# Patient Record
Sex: MALE | Race: WHITE | Employment: FULL TIME | URBAN - METROPOLITAN AREA
[De-identification: names, ages, dates, MRNs, and addresses within clinical notes are randomized per-mention and may not be internally consistent; named-entity substitution may affect disease eponyms.]

---

## 2020-06-11 ENCOUNTER — OFFICE VISIT (OUTPATIENT)
Dept: ORTHOPEDIC SURGERY | Age: 60
End: 2020-06-11
Payer: COMMERCIAL

## 2020-06-11 PROCEDURE — L3040 FT ARCH SUPRT PREMOLD LONGIT: HCPCS | Performed by: ORTHOPAEDIC SURGERY

## 2020-06-11 PROCEDURE — 99204 OFFICE O/P NEW MOD 45 MIN: CPT | Performed by: ORTHOPAEDIC SURGERY

## 2020-06-25 ENCOUNTER — OFFICE VISIT (OUTPATIENT)
Dept: ORTHOPEDIC SURGERY | Age: 60
End: 2020-06-25
Payer: COMMERCIAL

## 2020-06-25 VITALS — BODY MASS INDEX: 25.05 KG/M2 | WEIGHT: 175 LBS | TEMPERATURE: 97.8 F | HEIGHT: 70 IN

## 2020-06-25 PROBLEM — S83.231A COMPLEX TEAR OF MEDIAL MENISCUS OF RIGHT KNEE AS CURRENT INJURY: Status: ACTIVE | Noted: 2020-06-25

## 2020-06-25 PROBLEM — M25.561 ACUTE PAIN OF RIGHT KNEE: Status: ACTIVE | Noted: 2020-06-25

## 2020-06-25 PROCEDURE — 99214 OFFICE O/P EST MOD 30 MIN: CPT | Performed by: ORTHOPAEDIC SURGERY

## 2020-06-25 NOTE — PROGRESS NOTES
6/25/20  History of Present Illness:  Marlene Mackey is a 64 y.o. male    Chief complaint today in the office: Check evaluation right knee here today to discuss options    Location right knee   Severity moderate to severe  Duration 6 months  Associated sign/symptoms pain, catching, locking, sharp stabbing pains trouble squatting trouble jogging    Medical History  Patient's medications, allergies, past medical, surgical, social and family histories were reviewed and updated as appropriate. I have reviewed and discussed the below Pain assessment findings with the patient. Review of Systems  No new rashes  No numbness  No tingling  No fever  No depression  No new pain patterns  Pertinent items are noted in HPI  Review of systems reviewed from Patient History Form dated on 6/11/2020 and available in the patient's chart under the Media tab. No change in the patient's medical history form. Examination:  General Exam:    Vitals: Temperature 97.8 °F (36.6 °C), height 5' 10\" (1.778 m), weight 175 lb (79.4 kg). BMI Readings from Last 3 Encounters:   06/25/20 25.11 kg/m²     Constitutional: Patient is adequately groomed with no evidence of malnutrition  Mental Status: The patient is alert and oriented to time, place and person. The patient's mood and affect are appropriate. Lymphatic: The lymphatic examination bilaterally reveals all areas to be without enlargement or induration. Vascular: Examination reveals no swelling or calf tenderness. Peripheral pulses are palpable and 2+. Neurological: The patient has good coordination. There is no weakness or sensory deficit. Skin:    Head/Neck: inspection reveals no rashes, ulcerations or lesions. Trunk:  inspection reveals no rashes, ulcerations or lesions. Right Lower Extremity: inspection reveals no rashes, ulcerations or lesions. Left Lower Extremity: inspection reveals no rashes, ulcerations or lesions. PHYSICAL EXAM:      Knee Examination  Inspection:  No abrasions no lacerations no signs of infection or obvious deformity mild to moderate obvious swelling and joint effusion. Palpation:   Palpation reveals moderate pain medial joint line,   Mild lateral joint line pain, mild joint effusion. Range of Motion: 0-150 degrees flexion/ extension   Hip extension to 20 hip flexion to 70+  Lumbar ROM -20 extension flexion to 6 inches from the floor. Strength: Quadriceps testing 5/5, hamstring muscle testing 5/5, EHL against resistance is 5/5, hip flexor strength is intact 5/5, internal and external rotation of the hip against resistance is also intact 5/5. Special Tests: stable Lachman, negative anterior drawer, negative pivot shift, no posterior sag no posterior drawer does not open to valgus or varus stress at 0 or 30°, Steinmann's positive painful medial, Shilpa's positive painful medial, Homans negative Stephan negative, pedal pulses are +2/4 capillary refill is brisk sensation is intact ankle exam and hip exam are shows no pain with full range of motion provocative testing of the hip is nonpainful muscle testing around the hip is 5 over 5. Lumbar flexion to 6 inches from floor without pain. Gait: antalgic     Reflex: Intact  Lower extremity Deep tendon reflexes +2/4 and equal bilaterally for patella and Achilles. Upper extremity reflexes:  of the biceps, triceps, brachioradialis +2/4 equal bilaterally. Contralateral  Knee: Negative Lachman negative anterior drawer negative pivot shift no posterior sag no posterior drawer does not open to valgus or varus stress at 0 or 30° negative Steinmann's negative Shilpa's negative Homans negative Stephan pedal pulses are +2/4 capillary refill is brisk sensation is intact ankle exam and hip exam are shows no pain with full range of motion provocative testing of the hip is nonpainful muscle testing around the hip is 5 over 5.       Hip and lumbar testing does not reproduce pain evocative testing does not reproduce symptomatology. Additional Examinations:  Right Upper Extremity:  Examination of the right upper extremity does not show any tenderness, deformity or injury. Range of motion is unremarkable. There is no gross instability. There are no rashes, ulcerations or lesions. Strength and tone are normal.  Left Upper Extremity: Examination of the left upper extremity does not show any tenderness, deformity or injury. Range of motion is unremarkable. There is no gross instability. There are no rashes, ulcerations or lesions. Strength and tone are normal.  Lower Back: Examination of the lower back does not show any tenderness, deformity or injury. Range of motion is unremarkable. There is no gross instability. There are no rashes, ulcerations or lesions. Strength and tone are normal.    History reviewed. No pertinent surgical history. .    Radiology:     X-rays reviewed in office:  I independently reviewed the films in the office today. Views MRI multiple views  Body Part right knee  Impression large complex medial meniscus tear    Xr Knee Right (min 4 Views)    Result Date: 2020  Radiology exam is complete. No Radiologist dictation. Please follow up with ordering provider. Mri Lower Extremity W Jt Wo Contrast    Result Date: 2020  Site: [facility] #: [id number] #: [id number] Procedure: MR Right Knee w/o Contrast ; Reason for Exam: acute right knee pain, r/o mmt This document is confidential medical information. Unauthorized disclosure or use of this information is prohibited by law. If you are not the intended recipient of this document, please advise us by calling immediately [phone]. [facility], [address] Patient Name: Terrance Dubon Case ID: [id number] Patient : 1960 Referring Physician: Damaris Rivers DO Exam Date: 2020 Exam Description: MR Right Knee w/o Contrast HISTORY:  Acute right knee pain, would like to proceed. The patient will be scheduled accordingly    5. Healthy Lifestyle Measures:  Patient education material reviewing the following was distributed to Keren Anguiano  Anatomic drawings  Healthy lifestyle education  Advanced imaging preparedness    Proper lifting and carrying techniques,   Weight management discussed  Quitting smoking      6. Follow up:  after surgery      Keren Anguiano was instructed to call the office if his symptoms worsen or if new symptoms appear prior to the next scheduled visit. He is specifically instructed to contact the office between now & his scheduled appointment if he has concerns related to his condition or if he needs assistance in scheduling the above tests. He is   welcome to call for an appointment sooner if he has any additional concerns or questions. Yanira Sheikh DO    [facility]  Sports Fellowship Trained  Board Certified  [employer]se [employer] Team Physician      Disclaimer: \"This note was dictated with voice recognition software. Though review and correction are routine, we apologize for any errors. \"    The patient was counseled at length about the risks of gunner Covid-19 during their perioperative period and any recovery window from their procedure. The patient was made aware that gunner Covid-19  may worsen their prognosis for recovering from their procedure  and lend to a higher morbidity and/or mortality risk. All material risks, benefits, and reasonable alternatives including postponing the procedure were discussed. The patient does wish to proceed with the procedure at this time.

## 2020-06-30 ENCOUNTER — TELEPHONE (OUTPATIENT)
Dept: ORTHOPEDIC SURGERY | Age: 60
End: 2020-06-30

## 2020-07-01 NOTE — PROGRESS NOTES
Name_______________________________________Printed:____________________  Date and time of surgery_________7/7/20 1415_______________Arrival Time:______1245 masc__________   1. The instructions given regarding when and if a patient needs to stop oral intake prior to surgery varies. Follow the specific instructions you were given                  _x__Nothing to eat or to drink after Midnight the night before.                             ____Endoscopy patient follow your DRS instructions-generally you will be doing a part of the prep after Midnight                   ____Carbo loading or ERAS instructions will be given to select patients-if you have been given those instructions -please do the following                           The evening before your surgery after dinner before midnight drink 40 ounces of gatorade. If you are diabetic use sugar free. The morning of surgery drink 40 ounces of water. This needs to be finished 3 hours prior to your surgery start time. 2. Take the following pills with a small sip of water on the morning of surgery___________________________________________________                  Do not take blood pressure medications ending in pril or sartan the christ prior to surgery or the morning of surgery_   3. Aspirin, Ibuprofen, Advil, Naproxen, Vitamin E and other Anti-inflammatory products and supplements should be stopped for 5 -7days before surgery or as directed by your physician. 4. Check with your Doctor regarding stopping Plavix, Coumadin,Eliquis, Lovenox,Effient,Pradaxa,Xarelto, Fragmin or other blood thinners and follow their instructions. 5. Do not smoke, and do not drink any alcoholic beverages 24 hours prior to surgery. This includes NA Beer. Refrain from the usage of any recreational drugs. 6. You may brush your teeth and gargle the morning of surgery. DO NOT SWALLOW WATER   7.  You MUST make arrangements for a responsible adult to stay on site while you are here and take you home after your surgery. You will not be allowed to leave alone or drive yourself home. It is strongly suggested someone stay with you the first 24 hrs. Your surgery will be cancelled if you do not have a ride home. 8. A parent/legal guardian must accompany a child scheduled for surgery and plan to stay at the hospital until the child is discharged. Please do not bring other children with you. 9. Please wear simple, loose fitting clothing to the hospital.  Letty Porch not bring valuables (money, credit cards, checkbooks, etc.) Do not wear any makeup (including no eye makeup) or nail polish on your fingers or toes. 10. DO NOT wear any jewelry or piercings on day of surgery. All body piercing jewelry must be removed. 11. If you have ___dentures, they will be removed before going to the OR; we will provide you a container. If you wear ___contact lenses or ___glasses, they will be removed; please bring a case for them. 12. Please see your family doctor/pediatrician for a history & physical and/or concerning medications. Bring any test results/reports from your physician's office. PCP________x__________Phone___________H&P Appt. Date________             13 If you  have a Living Will and Durable Power of  for Healthcare, please bring in a copy. 15. Notify your Surgeon if you develop any illness between now and surgery  time, cough, cold, fever, sore throat, nausea, vomiting, etc.  Please notify your surgeon if you experience dizziness, shortness of breath or blurred vision between now & the time of your surgery             15. DO NOT shave your operative site 96 hours prior to surgery. For face & neck surgery, men may use an electric razor 48 hours prior to surgery. 16. Shower the night before or morning of surgery using an antibacterial soap or as you have been instructed.              17. To provide excellent care visitors will be limited to one in the room at any given time. 18.  Please bring picture ID and insurance card. 19.  Visit our web site for additional information:  [email or web address]              20.During flu season no children under the age of 15 are permitted in the hospital for the safety of all patients. 21. If you take a long acting insulin in the evening only  take half of your usual  dose the night  before your procedure              22. If you use a c-pap please bring DOS if staying overnight,             23.For your convenience [facility] has a pharmacy on site to fill your prescriptions. 24. If you use oxygen and have a portable tank please bring it  with you the DOS             25. Bring a complete list of all your medications with name and dose include any supplements. 26. Other__________________________________________   *Please call pre admission testing if you any further questions   [address]. [facility]  [phone]   [address] [place] [address]       All above information reviewed with patient in person or by phone. Patient verbalizes understanding. All questions and concerns addressed. Patient/Rep_________pt___________    Patient instructed to get their COVID-19 test done as directed by their doctor (5-7 days prior to procedure)  or patient states will get on __7/2/20________. I The day the COVID test is done is considered day one. Instructed to self quarantine after test until DOS. There is a one visitor policy at [facility] for the pre-op phase only for surgery and endoscopy cases. The visitor is expected to leave the facility after the patient is taken back for the procedure. Pain management is NO VISITOR policyThe patients ride is expected to remain in the car with a cell phone for communication. If the ride is leaving the hospital grounds please make sure they are back in time for pickup. Have the patient inform the staff on arrival what their rides plans are while the patient is in the facility. At the MAIN there is one visitor allowed. Please note that the visitor policy is subject to change.                                                                                                                                     PRE OP INSTRUCTIONS

## 2020-07-02 ENCOUNTER — OFFICE VISIT (OUTPATIENT)
Dept: PRIMARY CARE CLINIC | Age: 60
End: 2020-07-02
Payer: COMMERCIAL

## 2020-07-02 ENCOUNTER — TELEPHONE (OUTPATIENT)
Dept: ORTHOPEDIC SURGERY | Age: 60
End: 2020-07-02

## 2020-07-02 PROCEDURE — 99211 OFF/OP EST MAY X REQ PHY/QHP: CPT | Performed by: NURSE PRACTITIONER

## 2020-07-02 RX ORDER — MELOXICAM 15 MG/1
15 TABLET ORAL DAILY
Qty: 90 TABLET | Refills: 3 | Status: SHIPPED | OUTPATIENT
Start: 2020-07-07

## 2020-07-02 RX ORDER — HYDROCODONE BITARTRATE AND ACETAMINOPHEN 5; 325 MG/1; MG/1
1 TABLET ORAL EVERY 6 HOURS PRN
Qty: 28 TABLET | Refills: 0 | Status: SHIPPED | OUTPATIENT
Start: 2020-07-07 | End: 2020-07-14

## 2020-07-02 NOTE — TELEPHONE ENCOUNTER
Maria Del Carmen Roll    Date: 07/07/2020  Type of SX:  OP  Location: [facility]   CPT: 80935   DX Code: L61.831L  SX area: RT knee scope  Insurance: [employer]

## 2020-07-02 NOTE — PATIENT INSTRUCTIONS
Steps to help prevent the spread of COVID-19 if you are sick  SOURCE - [email or web address] html     Stay home except to get medical care   ; Stay home: People who are mildly ill with COVID-19 are able to isolate at home during their illness. You should restrict activities outside your home, except for getting medical care.   ; Avoid public areas: Do not go to work, school, or public areas.   ; Avoid public transportation: Avoid using public transportation, ride-sharing, or taxis.  ; Separate yourself from other people and animals in your home   ; Stay away from others: As much as possible, you should stay in a specific room and away from other people in your home. Also, you should use a separate bathroom, if available.   ; Limit contact with pets & animals: You should restrict contact with pets and other animals while you are sick with COVID-19, just like you would around other people. Although there have not been reports of pets or other animals becoming sick with COVID-19, it is still recommended that people sick with COVID-19 limit contact with animals until more information is known about the virus. ; When possible, have another member of your household care for your animals while you are sick. If you are sick with COVID-19, avoid contact with your pet, including petting, snuggling, being kissed or licked, and sharing food. If you must care for your pet or be around animals while you are sick, wash your hands before and after you interact with pets and wear a facemask. See COVID-19 and Animals for more information. Other considerations   The ill person should eat/be fed in their room if possible. Non-disposable  items used should be handled with gloves and washed with hot water or in a . Clean hands after handling used  items.  If possible, dedicate a lined trash can for the ill person.  Use gloves when removing garbage bags, handling, and disposing of trash. Wash hands after handling or disposing of trash.  Consider consulting with your local health department about trash disposal guidance if available. Information for Household Members and Caregivers of Someone who is Sick   Call ahead before visiting your doctor   Call ahead: If you have a medical appointment, call the healthcare provider and tell them that you have or may have COVID-19. This will help the healthcare provider's office take steps to keep other people from getting infected or exposed. Wear a facemask if you are sick   ; If you are sick: You should wear a facemask when you are around other people (e.g., sharing a room or vehicle) or pets and before you enter a healthcare provider's office. ; If you are caring for others: If the person who is sick is not able to wear a facemask (for example, because it causes trouble breathing), then people who live with the person who is sick should not stay in the same room with them, or they should wear a facemask if they enter a room with the person who is sick. Cover your coughs and sneezes   ; Cover: Cover your mouth and nose with a tissue when you cough or sneeze.   ; Dispose: Throw used tissues in a lined trash can.   ; Wash hands: Immediately wash your hands with soap and water for at least 20 seconds or, if soap and water are not available, clean your hands with an alcohol-based hand  that contains at least 60% alcohol. Clean your hands often   ;  Wash hands: Wash your hands often with soap and water for at least 20 seconds, especially after blowing your nose, coughing, or sneezing; going to the bathroom; and before eating or preparing food.   ; Hand : If soap and water are not readily available, use an alcohol-based hand  with at least 60% alcohol, covering all surfaces of your hands and rubbing them together until they feel dry.   ; Soap and water: Soap and water are the best option if hands are visibly dirty.   ; Avoid touching: Avoid touching your eyes, nose, and mouth with unwashed hands. Handwashing Tips   ; Wet your hands with clean, running water (warm or cold), turn off the tap, and apply soap.  ; Lather your hands by rubbing them together with the soap. Lather the backs of your hands, between your fingers, and under your nails. ; Scrub your hands for at least 20 seconds. Need a timer? Hum the Zaleski from beginning to end twice.  ; Rinse your hands well under clean, running water.  ; Dry your hands using a clean towel or air dry them. Avoid sharing personal household items   ; Do not share: You should not share dishes, drinking glasses, cups, eating utensils, towels, or bedding with other people or pets in your home.   ; Wash thoroughly after use: After using these items, they should be washed thoroughly with soap and water. Clean all high-touch surfaces everyday   ; Clean and disinfect: Practice routine cleaning of high touch surfaces.  ; High touch surfaces include counters, tabletops, doorknobs, bathroom fixtures, toilets, phones, keyboards, tablets, and bedside tables.  ; Disinfect areas with bodily fluids: Also, clean any surfaces that may have blood, stool, or body fluids on them.   ; Household : Use a household cleaning spray or wipe, according to the label instructions. Labels contain instructions for safe and effective use of the cleaning product including precautions you should take when applying the product, such as wearing gloves and making sure you have good ventilation during use of the product.     Monitor your symptoms   Seek medical attention: Seek prompt medical attention if your illness is worsening     (e.g., difficulty breathing).   ; Call your doctor: Before seeking care, call your healthcare provider and tell them that you have, or are being evaluated for, COVID-19.   ; Wear a facemask when sick: Put on a facemask before you enter the facility. These steps will help the healthcare provider's office to keep other people in the office or waiting room from getting infected or exposed. ; Alert health department: Ask your healthcare provider to call the local or state health department. Persons who are placed under active monitoring or facilitated self-monitoring should follow instructions provided by their local health department or occupational health professionals, as appropriate.  ; Call 911 if you have a medical emergency: If you have a medical emergency and need to call 911, notify the dispatch personnel that you have, or are being evaluated for COVID-19. If possible, put on a facemask before emergency medical services arrive. Thank you for enrolling in [address] [facility]. Please follow the instructions below to securely access your online medical record. [employer] allows you to send messages to your doctor, view your test results, renew your prescriptions, schedule appointments, and more. How Do I Sign Up? 1. In your Internet browser, go to [email or web address]  2. Click on the Sign Up Now link in the Sign In box. You will see the New Member Sign Up page. 3. Enter your [employer]ora Access Code exactly as it appears below. You will not need to use this code after youve completed the sign-up process. If you do not sign up before the expiration date, you must request a new code. [employer]ora Access Code: [email or web address]  Expires: 8/16/2020  8:54 AM    4. Enter your Social Security Number ([id number]) and Date of Birth (mm/dd/yyyy) as indicated and click Submit. You will be taken to the next sign-up page. 5. Create a [employer] ID. This will be your [employer]ora login ID and cannot be changed, so think of one that is secure and easy to remember. 6. Create a [employer] password. You can change your password at any time. 7. Enter your Password Reset Question and Answer. This can be used at a later time if you forget your password.    8. Enter your e-mail address. You will receive e-mail notification when new information is available in [address] 9. Click Sign Up. You can now view your medical record. Additional Information  If you have questions, please contact your physician practice where you receive care. Remember, MyChart is NOT to be used for urgent needs. For medical emergencies, dial 911.

## 2020-07-04 LAB
SARS-COV-2: NOT DETECTED
SOURCE: NORMAL

## 2020-07-06 ENCOUNTER — HOSPITAL ENCOUNTER (OUTPATIENT)
Age: 60
Setting detail: OUTPATIENT SURGERY
Discharge: HOME | End: 2020-07-07
Attending: ORTHOPAEDIC SURGERY | Admitting: ORTHOPAEDIC SURGERY
Payer: COMMERCIAL

## 2020-07-07 ENCOUNTER — ANESTHESIA EVENT (OUTPATIENT)
Dept: OPERATING ROOM | Age: 60
End: 2020-07-07
Payer: COMMERCIAL

## 2020-07-07 ENCOUNTER — ANESTHESIA (OUTPATIENT)
Dept: OPERATING ROOM | Age: 60
End: 2020-07-07
Payer: COMMERCIAL

## 2020-07-07 VITALS
TEMPERATURE: 97.5 F | RESPIRATION RATE: 16 BRPM | DIASTOLIC BLOOD PRESSURE: 72 MMHG | HEART RATE: 54 BPM | OXYGEN SATURATION: 99 % | BODY MASS INDEX: 24.62 KG/M2 | HEIGHT: 70 IN | WEIGHT: 172 LBS | SYSTOLIC BLOOD PRESSURE: 116 MMHG

## 2020-07-07 VITALS
RESPIRATION RATE: 13 BRPM | SYSTOLIC BLOOD PRESSURE: 115 MMHG | DIASTOLIC BLOOD PRESSURE: 65 MMHG | OXYGEN SATURATION: 98 %

## 2020-07-07 PROCEDURE — 2709999900 HC NON-CHARGEABLE SUPPLY: Performed by: ORTHOPAEDIC SURGERY

## 2020-07-07 PROCEDURE — 3700000000 HC ANESTHESIA ATTENDED CARE: Performed by: ORTHOPAEDIC SURGERY

## 2020-07-07 PROCEDURE — 7100000010 HC PHASE II RECOVERY - FIRST 15 MIN: Performed by: ORTHOPAEDIC SURGERY

## 2020-07-07 PROCEDURE — 2580000003 HC RX 258: Performed by: ORTHOPAEDIC SURGERY

## 2020-07-07 PROCEDURE — 6360000002 HC RX W HCPCS: Performed by: ORTHOPAEDIC SURGERY

## 2020-07-07 PROCEDURE — 7100000000 HC PACU RECOVERY - FIRST 15 MIN: Performed by: ORTHOPAEDIC SURGERY

## 2020-07-07 PROCEDURE — 2500000003 HC RX 250 WO HCPCS: Performed by: NURSE ANESTHETIST, CERTIFIED REGISTERED

## 2020-07-07 PROCEDURE — 2720000010 HC SURG SUPPLY STERILE: Performed by: ORTHOPAEDIC SURGERY

## 2020-07-07 PROCEDURE — 2580000003 HC RX 258: Performed by: ANESTHESIOLOGY

## 2020-07-07 PROCEDURE — 7100000011 HC PHASE II RECOVERY - ADDTL 15 MIN: Performed by: ORTHOPAEDIC SURGERY

## 2020-07-07 PROCEDURE — 6360000002 HC RX W HCPCS: Performed by: NURSE ANESTHETIST, CERTIFIED REGISTERED

## 2020-07-07 PROCEDURE — 3700000001 HC ADD 15 MINUTES (ANESTHESIA): Performed by: ORTHOPAEDIC SURGERY

## 2020-07-07 PROCEDURE — 3600000004 HC SURGERY LEVEL 4 BASE: Performed by: ORTHOPAEDIC SURGERY

## 2020-07-07 PROCEDURE — 7100000001 HC PACU RECOVERY - ADDTL 15 MIN: Performed by: ORTHOPAEDIC SURGERY

## 2020-07-07 PROCEDURE — 2500000003 HC RX 250 WO HCPCS: Performed by: ORTHOPAEDIC SURGERY

## 2020-07-07 PROCEDURE — 3600000014 HC SURGERY LEVEL 4 ADDTL 15MIN: Performed by: ORTHOPAEDIC SURGERY

## 2020-07-07 RX ORDER — FENTANYL CITRATE 50 UG/ML
INJECTION, SOLUTION INTRAMUSCULAR; INTRAVENOUS PRN
Status: DISCONTINUED | OUTPATIENT
Start: 2020-07-07 | End: 2020-07-07 | Stop reason: SDUPTHER

## 2020-07-07 RX ORDER — PROPOFOL 10 MG/ML
INJECTION, EMULSION INTRAVENOUS CONTINUOUS PRN
Status: DISCONTINUED | OUTPATIENT
Start: 2020-07-07 | End: 2020-07-07 | Stop reason: SDUPTHER

## 2020-07-07 RX ORDER — LIDOCAINE HYDROCHLORIDE 10 MG/ML
1 INJECTION, SOLUTION EPIDURAL; INFILTRATION; INTRACAUDAL; PERINEURAL
Status: DISCONTINUED | OUTPATIENT
Start: 2020-07-07 | End: 2020-07-07 | Stop reason: HOSPADM

## 2020-07-07 RX ORDER — SODIUM CHLORIDE 0.9 % (FLUSH) 0.9 %
10 SYRINGE (ML) INJECTION EVERY 12 HOURS SCHEDULED
Status: DISCONTINUED | OUTPATIENT
Start: 2020-07-07 | End: 2020-07-07 | Stop reason: HOSPADM

## 2020-07-07 RX ORDER — PROPOFOL 10 MG/ML
INJECTION, EMULSION INTRAVENOUS PRN
Status: DISCONTINUED | OUTPATIENT
Start: 2020-07-07 | End: 2020-07-07 | Stop reason: SDUPTHER

## 2020-07-07 RX ORDER — HYDRALAZINE HYDROCHLORIDE 20 MG/ML
5 INJECTION INTRAMUSCULAR; INTRAVENOUS EVERY 10 MIN PRN
Status: DISCONTINUED | OUTPATIENT
Start: 2020-07-07 | End: 2020-07-07 | Stop reason: HOSPADM

## 2020-07-07 RX ORDER — SODIUM CHLORIDE 0.9 % (FLUSH) 0.9 %
10 SYRINGE (ML) INJECTION PRN
Status: DISCONTINUED | OUTPATIENT
Start: 2020-07-07 | End: 2020-07-07 | Stop reason: HOSPADM

## 2020-07-07 RX ORDER — SODIUM CHLORIDE, SODIUM LACTATE, POTASSIUM CHLORIDE, CALCIUM CHLORIDE 600; 310; 30; 20 MG/100ML; MG/100ML; MG/100ML; MG/100ML
INJECTION, SOLUTION INTRAVENOUS CONTINUOUS
Status: DISCONTINUED | OUTPATIENT
Start: 2020-07-07 | End: 2020-07-07 | Stop reason: HOSPADM

## 2020-07-07 RX ORDER — LIDOCAINE HYDROCHLORIDE 20 MG/ML
INJECTION, SOLUTION EPIDURAL; INFILTRATION; INTRACAUDAL; PERINEURAL PRN
Status: DISCONTINUED | OUTPATIENT
Start: 2020-07-07 | End: 2020-07-07 | Stop reason: SDUPTHER

## 2020-07-07 RX ORDER — MEPERIDINE HYDROCHLORIDE 25 MG/ML
12.5 INJECTION INTRAMUSCULAR; INTRAVENOUS; SUBCUTANEOUS EVERY 5 MIN PRN
Status: DISCONTINUED | OUTPATIENT
Start: 2020-07-07 | End: 2020-07-07 | Stop reason: HOSPADM

## 2020-07-07 RX ORDER — LABETALOL HYDROCHLORIDE 5 MG/ML
5 INJECTION, SOLUTION INTRAVENOUS EVERY 10 MIN PRN
Status: DISCONTINUED | OUTPATIENT
Start: 2020-07-07 | End: 2020-07-07 | Stop reason: HOSPADM

## 2020-07-07 RX ORDER — HYDROMORPHONE HCL 110MG/55ML
0.5 PATIENT CONTROLLED ANALGESIA SYRINGE INTRAVENOUS EVERY 5 MIN PRN
Status: DISCONTINUED | OUTPATIENT
Start: 2020-07-07 | End: 2020-07-07 | Stop reason: HOSPADM

## 2020-07-07 RX ORDER — ONDANSETRON 2 MG/ML
INJECTION INTRAMUSCULAR; INTRAVENOUS PRN
Status: DISCONTINUED | OUTPATIENT
Start: 2020-07-07 | End: 2020-07-07 | Stop reason: SDUPTHER

## 2020-07-07 RX ORDER — OXYCODONE HYDROCHLORIDE AND ACETAMINOPHEN 5; 325 MG/1; MG/1
1 TABLET ORAL
Status: DISCONTINUED | OUTPATIENT
Start: 2020-07-07 | End: 2020-07-07 | Stop reason: HOSPADM

## 2020-07-07 RX ORDER — ONDANSETRON 2 MG/ML
4 INJECTION INTRAMUSCULAR; INTRAVENOUS
Status: DISCONTINUED | OUTPATIENT
Start: 2020-07-07 | End: 2020-07-07 | Stop reason: HOSPADM

## 2020-07-07 RX ORDER — DEXAMETHASONE SODIUM PHOSPHATE 4 MG/ML
INJECTION, SOLUTION INTRA-ARTICULAR; INTRALESIONAL; INTRAMUSCULAR; INTRAVENOUS; SOFT TISSUE PRN
Status: DISCONTINUED | OUTPATIENT
Start: 2020-07-07 | End: 2020-07-07 | Stop reason: SDUPTHER

## 2020-07-07 RX ORDER — KETOROLAC TROMETHAMINE 30 MG/ML
INJECTION, SOLUTION INTRAMUSCULAR; INTRAVENOUS PRN
Status: DISCONTINUED | OUTPATIENT
Start: 2020-07-07 | End: 2020-07-07 | Stop reason: SDUPTHER

## 2020-07-07 RX ADMIN — FENTANYL CITRATE 25 MCG: 50 INJECTION, SOLUTION INTRAMUSCULAR; INTRAVENOUS at 13:04

## 2020-07-07 RX ADMIN — SODIUM CHLORIDE, POTASSIUM CHLORIDE, SODIUM LACTATE AND CALCIUM CHLORIDE: 600; 310; 30; 20 INJECTION, SOLUTION INTRAVENOUS at 11:49

## 2020-07-07 RX ADMIN — PROPOFOL 120 MCG/KG/MIN: 10 INJECTION, EMULSION INTRAVENOUS at 13:06

## 2020-07-07 RX ADMIN — KETOROLAC TROMETHAMINE 15 MG: 30 INJECTION, SOLUTION INTRAMUSCULAR; INTRAVENOUS at 13:45

## 2020-07-07 RX ADMIN — LIDOCAINE HYDROCHLORIDE 60 MG: 20 INJECTION, SOLUTION EPIDURAL; INFILTRATION; INTRACAUDAL; PERINEURAL at 13:06

## 2020-07-07 RX ADMIN — FENTANYL CITRATE 25 MCG: 50 INJECTION, SOLUTION INTRAMUSCULAR; INTRAVENOUS at 13:45

## 2020-07-07 RX ADMIN — PROPOFOL 80 MG: 10 INJECTION, EMULSION INTRAVENOUS at 13:06

## 2020-07-07 RX ADMIN — DEXAMETHASONE SODIUM PHOSPHATE 8 MG: 4 INJECTION, SOLUTION INTRAMUSCULAR; INTRAVENOUS at 13:10

## 2020-07-07 RX ADMIN — FENTANYL CITRATE 25 MCG: 50 INJECTION, SOLUTION INTRAMUSCULAR; INTRAVENOUS at 13:10

## 2020-07-07 RX ADMIN — ONDANSETRON 4 MG: 2 INJECTION INTRAMUSCULAR; INTRAVENOUS at 13:10

## 2020-07-07 RX ADMIN — FENTANYL CITRATE 25 MCG: 50 INJECTION, SOLUTION INTRAMUSCULAR; INTRAVENOUS at 13:37

## 2020-07-07 RX ADMIN — CEFAZOLIN SODIUM 2 G: 10 INJECTION, POWDER, FOR SOLUTION INTRAVENOUS at 12:39

## 2020-07-07 ASSESSMENT — PULMONARY FUNCTION TESTS
PIF_VALUE: 1
PIF_VALUE: 0
PIF_VALUE: 1
PIF_VALUE: 0
PIF_VALUE: 1
PIF_VALUE: 3
PIF_VALUE: 1

## 2020-07-07 ASSESSMENT — PAIN DESCRIPTION - LOCATION: LOCATION: KNEE

## 2020-07-07 ASSESSMENT — PAIN DESCRIPTION - DESCRIPTORS: DESCRIPTORS: ACHING

## 2020-07-07 ASSESSMENT — PAIN - FUNCTIONAL ASSESSMENT
PAIN_FUNCTIONAL_ASSESSMENT: 0-10
PAIN_FUNCTIONAL_ASSESSMENT: PREVENTS OR INTERFERES SOME ACTIVE ACTIVITIES AND ADLS

## 2020-07-07 ASSESSMENT — PAIN SCALES - GENERAL: PAINLEVEL_OUTOF10: 2

## 2020-07-07 ASSESSMENT — LIFESTYLE VARIABLES: SMOKING_STATUS: 0

## 2020-07-07 ASSESSMENT — PAIN DESCRIPTION - ORIENTATION: ORIENTATION: RIGHT

## 2020-07-07 NOTE — ANESTHESIA POSTPROCEDURE EVALUATION
Department of Anesthesiology  Postprocedure Note    Patient: Johnna Sheth  MRN: [id number]  YOB: 1960  Date of evaluation: 7/7/2020  Time:  2:08 PM     Procedure Summary     Date:  07/07/20 Room / Location:  [address]    Anesthesia Start:  9309 Anesthesia Stop:  6635    Procedure:  RIGHT KNEE ARTHROSCOPY, PARTIAL MEDIAL MENISCECTOMY, SYNOVECTOMY (Right Knee) Diagnosis:  (RIGHT KNEE MEDIAL MENISCUS TEAR S83.241D)    Surgeon:  Abner Clifford DO Responsible Provider:  Selena Ding MD    Anesthesia Type:  MAC ASA Status:  1          Anesthesia Type: MAC    Talib Phase I: Talib Score: 10    Talib Phase II:      Last vitals: Reviewed and per EMR flowsheets.        Anesthesia Post Evaluation    Patient location during evaluation: PACU  Patient participation: complete - patient participated  Level of consciousness: awake and alert  Pain score: 2  Airway patency: patent  Nausea & Vomiting: no vomiting  Complications: no  Cardiovascular status: blood pressure returned to baseline  Respiratory status: acceptable  Hydration status: euvolemic

## 2020-07-07 NOTE — ANESTHESIA PRE PROCEDURE
Date    APPENDECTOMY      DENTAL SURGERY      HAND SURGERY      HERNIA REPAIR         Social History:    Social History     Tobacco Use    Smoking status: Never Smoker    Smokeless tobacco: Never Used   Substance Use Topics    Alcohol use: Yes     Comment: social                                Counseling given: Not Answered      Vital Signs (Current):   Vitals:    07/01/20 1411   Weight: 174 lb (78.9 kg)   Height: 5' 10\" (1.778 m)                                              BP Readings from Last 3 Encounters:   No data found for BP       NPO Status:                                                                                 BMI:   Wt Readings from Last 3 Encounters:   07/01/20 174 lb (78.9 kg)   06/25/20 175 lb (79.4 kg)     Body mass index is 24.97 kg/m². CBC: No results found for: WBC, RBC, HGB, HCT, MCV, RDW, PLT    CMP: No results found for: NA, K, CL, CO2, BUN, CREATININE, GFRAA, AGRATIO, LABGLOM, GLUCOSE, PROT, CALCIUM, BILITOT, ALKPHOS, AST, ALT    POC Tests: No results for input(s): POCGLU, POCNA, POCK, POCCL, POCBUN, POCHEMO, POCHCT in the last 72 hours.     Coags: No results found for: PROTIME, INR, APTT    HCG (If Applicable): No results found for: PREGTESTUR, PREGSERUM, HCG, HCGQUANT     ABGs: No results found for: PHART, PO2ART, YRQ9JSQ, BRT5OAI, BEART, E1BBPTNP     Type & Screen (If Applicable):  No results found for: LABABO, LABRH    Drug/Infectious Status (If Applicable):  No results found for: HIV, HEPCAB    COVID-19 Screening (If Applicable):   Lab Results   Component Value Date    COVID19 Not Detected 07/02/2020         Anesthesia Evaluation  Patient summary reviewed and Nursing notes reviewed no history of anesthetic complications:   Airway: Mallampati: II  TM distance: >3 FB   Neck ROM: full  Mouth opening: > = 3 FB Dental: normal exam         Pulmonary:Negative Pulmonary ROS and normal exam  breath sounds clear to auscultation      (-) COPD, asthma, sleep apnea and not a current

## 2020-07-07 NOTE — OP NOTE
Operative Note      Patient: Josh Rivero  YOB: 1960  MRN: [id number]    Date of Procedure: 7/7/2020    Pre-Op Diagnosis: RIGHT KNEE MEDIAL MENISCUS TEAR S83.241D    Post-Op Diagnosis: Same       Procedure(s):  RIGHT KNEE ARTHROSCOPE, PARTIAL MEDIAL MENISCECTOMY VERSUS REPAIR, AND INDICATED PROCEDURES    Surgeon(s):  Latia Hart DO    Assistant:   * No surgical staff found *    Anesthesia: Monitor Anesthesia Care    Estimated Blood Loss (mL): Minimal    Complications: None    Specimens:   * No specimens in log *    Implants:  * No implants in log *      Drains: * No LDAs found *    Findings: Right knee medial meniscus tear, right knee hyperemic hypertrophic synovitis, right knee chondromalacia    Detailed Description of Procedure:   Right knee diagnostic arthroscopy with right knee arthroscopic partial medial meniscectomy, right knee arthroscopic hyperemic hypertrophic synovectomy, right knee chondroplasty    Electronically signed by Latia Hart DO on 7/7/2020 at 7:00 AM                             [address], [address] [place] [address]       Operative note by  Moises Kaur. Angel Garg MS,   Orthopedic surgeon  Orthopedics sports Fellowship trained  Board-certified  Team Physician for [employer]                       Knee Arthroscopy      Patient Name:  Josh Rivero    YOB: 1960    Medical  Record number: [id number]    Account number: [de-identified]     Date Of Surgery: 7/7/2020    Date Of Dictation: 7/7/2020    Location: [address]    Surgeon: Dr. Stephanie Holland    Assistant: None    Anesthesia: Local with General    Indications:  Chronic pain not alleviated by conservative therapy, positive MRI, not improved with conservative care    Complications: None    Estimated blood loss: Minimal    Preoperative antibiotics:

## 2020-07-10 RX ORDER — CEPHALEXIN 500 MG/1
500 CAPSULE ORAL 3 TIMES DAILY
Qty: 30 CAPSULE | Refills: 0 | OUTPATIENT
Start: 2020-07-10 | End: 2020-07-20

## 2020-07-10 NOTE — PROGRESS NOTES
Patient called stating that his knee is still red and swollen. Patient texted a picture of his knee which I forwarded to Dr. Allyson Ricketts. Dr. Allyson Ricketts said to put the patient on Keflex 500mg QID. Called into [facility] in [place]. Patient has been notified and is to f/u on Monday for his post op.

## 2020-07-13 ENCOUNTER — OFFICE VISIT (OUTPATIENT)
Dept: ORTHOPEDIC SURGERY | Age: 60
End: 2020-07-13

## 2020-07-13 VITALS — HEIGHT: 70 IN | WEIGHT: 172 LBS | TEMPERATURE: 97.4 F | BODY MASS INDEX: 24.62 KG/M2

## 2020-07-13 PROCEDURE — 99024 POSTOP FOLLOW-UP VISIT: CPT | Performed by: ORTHOPAEDIC SURGERY

## 2020-07-13 NOTE — PROGRESS NOTES
7/13/20  HISTORY OF PRESENT ILLNESS:   S/P Knee Arthroscopy  The Patient returns today for their postoperative visit after 7/7/2020 right knee arthroscopy. Pain control has been satisfactory with oral medications. There have been no fevers or chills. PHYSICAL EXAMINATION: Right knee  Inspection reveals expected swelling. Portal sites are clean and dry. The skin is warm. Range of motion is minimally limited by pain and swelling. There is no calf pain  Homans sign is negative. Examination of the contralateral knee reveals warm skin, range of motion within normal limits, good quadriceps bulk, tone and strength, no tenderness to palpation, stable cruciate and collateral ligaments, and no joint line tenderness. Examination of the Patients Lumbar spine reveals the skin is warm and dry. There is no swelling, warmth, or erythema. Range of motion is within normal limits. There is no paraspinal or spinous process tenderness. Ipsilateral and contralateral straight leg raising tests are negative. The distal neurovascular exam is grossly intact and symmetric. ASSESSMENT/PLAN:   The patient is doing well after knee arthroscopy. I have recommended ice, judicious use of NSAIDs, and physical therapy to diminish swelling and restore both motion and strength. I cautioned against overusing the knee, and they will schedule a reevaluation in 4 weeks  They verbalized good understanding of the plan. Sanjeev Guy D.O. [address] [place] [address] 20 and Sports Medicine  Sports Fellowship Trained  Board Certified  [facility] Team Physician      Disclaimer: \"This note was dictated with voice recognition software. Though review and correction are routine, we apologize for any errors. \"

## 2020-07-15 ENCOUNTER — HOSPITAL ENCOUNTER (OUTPATIENT)
Dept: PHYSICAL THERAPY | Age: 60
Setting detail: THERAPIES SERIES
Discharge: HOME | End: 2020-07-15
Payer: COMMERCIAL

## 2020-07-15 PROCEDURE — 97110 THERAPEUTIC EXERCISES: CPT

## 2020-07-15 PROCEDURE — 97016 VASOPNEUMATIC DEVICE THERAPY: CPT

## 2020-07-15 PROCEDURE — 97161 PT EVAL LOW COMPLEX 20 MIN: CPT

## 2020-07-15 PROCEDURE — MISC902 COLD PAC OVERSIZED: Performed by: ORTHOPAEDIC SURGERY

## 2020-07-15 NOTE — FLOWSHEET NOTE
[facility]    Physical Therapy Treatment Note/ Progress Report:     Date:  7/15/2020    Patient Name:  Lashay Lopez  \"Hayder\"  :  1960  MRN: [id number]  Medical/Treatment Diagnosis Information:  · Diagnosis: Complex tear of medial meniscus of right knee (S83.231D), acute pain of right knee (M25.561); s/p R knee arthroscop, PMM, synovectomy, chondroplasty of patella, trochlea, medial femoral condyle 20  · Treatment Diagnosis: Acute right knee pain  Insurance/Certification information:  PT Insurance Information: [employer] - $2287 OOP max, $70 co-pay (goes towards OOP), 40 PT visits per calendar year  Physician Information:  Referring Practitioner: Dr. Brianna Ruiz of care signed (Y/N):     Date of Patient follow up with Physician: 8/10/20     Progress Report: []  Yes  [x]  No     Functional Scale: LEFS = 37%   Date: 7/15/20    Date Range for reporting period:  Beginnin/15/20  Ending:      Progress report due (10 Rx/or 30 days whichever is less): 04     Recertification due (POC duration/ or 90 days whichever is less): 20     Visit # Insurance Allowable Auth Needed   1 36 - pending auth [x]Yes    []No     Pain level:  1-5/10     SUBJECTIVE:  See eval    OBJECTIVE: See eval   Observation:    Test measurements:      RESTRICTIONS/PRECAUTIONS: S/p R knee arthroscopy, PMM, synovectomy, chondroplasty of patella, trochlea and medial femoral condyle 20    Exercises/Interventions:     Therapeutic Ex 15' Resistance Sets/sec Reps Notes          HS stretch EOB  30\" 3    Supine ITB stretch  30\" 3 W/gren strap   Heel slides  10\" 10    Quad sets  5\" 20    SLR abduction  2 10                                                                          Manual Intervention 10'       Knee mobs/PROM 6'   Knee flexion mobs   Tib/Fem Mobs       Patella Mobs       Ankle mobs       STM/IASTM 4'   Distal ITB          NMR re-education Therapeutic Exercise and NMR EXR  [x] (72036) Provided verbal/tactile cueing for activities related to strengthening, flexibility, endurance, ROM for improvements in LE, proximal hip, and core control with self care, mobility, lifting, ambulation.  [] (66189) Provided verbal/tactile cueing for activities related to improving balance, coordination, kinesthetic sense, posture, motor skill, proprioception  to assist with LE, proximal hip, and core control in self care, mobility, lifting, ambulation and eccentric single leg control.      NMR and Therapeutic Activities:    [] (74902 or 55573) Provided verbal/tactile cueing for activities related to improving balance, coordination, kinesthetic sense, posture, motor skill, proprioception and motor activation to allow for proper function of core, proximal hip and LE with self care and ADLs  [] (61582) Gait Re-education- Provided training and instruction to the patient for proper LE, core and proximal hip recruitment and positioning and eccentric body weight control with ambulation re-education including up and down stairs     Home Exercise Program:    [x] (94603) Reviewed/Progressed HEP activities related to strengthening, flexibility, endurance, ROM of core, proximal hip and LE for functional self-care, mobility, lifting and ambulation/stair navigation   [] (52450)Reviewed/Progressed HEP activities related to improving balance, coordination, kinesthetic sense, posture, motor skill, proprioception of core, proximal hip and LE for self care, mobility, lifting, and ambulation/stair navigation      Manual Treatments:  PROM / STM / Oscillations-Mobs:  G-I, II, III, IV (PA's, Inf., Post.)  [x] (53860) Provided manual therapy to mobilize LE, proximal hip and/or LS spine soft tissue/joints for the purpose of modulating pain, promoting relaxation,  increasing ROM, reducing/eliminating soft tissue swelling/inflammation/restriction, improving soft tissue extensibility and allowing for proper ROM for normal function with self care, mobility, lifting and ambulation. Modalities: 15' game-ready to decrease post-op swelling and inflammation     Charges:  Timed Code Treatment Minutes: 25   Total Treatment Minutes: 55       [x] EVAL (LOW) 05486 (typically 20 minutes face-to-face)  [] EVAL (MOD) 65219 (typically 30 minutes face-to-face)  [] EVAL (HIGH) 49946 (typically 45 minutes face-to-face)  [] RE-EVAL     [x] TB(75123) x   1  [] IONTO (69607)  [] NMR (55855) x     [x] VASO (82677)  [] Manual (45922) x     [] Other:  [] TA (06032)x     [] Mech Traction (19920)  [] ES(attended) (93099)     [] ES (un) (78986):     GOALS:   Patient stated goal: \"To be able to run 2 miles\"  []? Progressing: []? Met: []? Not Met: []? Adjusted     Therapist goals for Patient:   Short Term Goals: To be achieved in: 2 weeks  1. Independent in HEP and progression per patient tolerance, in order to prevent re-injury. []? Progressing: []? Met: []? Not Met: []? Adjusted  2. Patient will have a decrease in pain to facilitate improvement in movement, function, and ADLs as indicated by Functional Deficits. []? Progressing: []? Met: []? Not Met: []? Adjusted     Long Term Goals: To be achieved in: 6 weeks  1. Disability index score of 25% or less for the LEFS to assist with reaching prior level of function. []? Progressing: []? Met: []? Not Met: []? Adjusted  2. Patient will demonstrate increased AROM to WNL for R knee to allow for proper joint functioning as indicated by patients Functional Deficits. []? Progressing: []? Met: []? Not Met: []? Adjusted  3. Patient will demonstrate an increase in Strength to good proximal hip strength and control, within 5lb HHD in LE to allow for proper functional mobility as indicated by patients Functional Deficits. []? Progressing: []? Met: []? Not Met: []? Adjusted  4.  Patient will return to walk for 1 hour without increased symptoms or restriction. []? Progressing: []? Met: []? Not Met: []? Adjusted  5. Patient will be able to squat to pick something up off of the floor without increased symptoms or restriction. []? Progressing: []? Met: []? Not Met: []? Adjusted         Progression Towards Functional goals:  [] Patient is progressing as expected towards functional goals listed. [] Progression is slowed due to complexities listed. [] Progression has been slowed due to co-morbidities. [x] Plan just implemented, too soon to assess goals progression  [] Other:     ASSESSMENT:  See eval    Return to Play: (if applicable)   []  Stage 1: Intro to Strength   []  Stage 2: Return to Run and Strength   []  Stage 3: Return to Jump and Strength   []  Stage 4: Dynamic Strength and Agility   []  Stage 5: Sport Specific Training     []  Ready to Return to Play, Meets All Above Stages   []  Not Ready for Return to Sports   Comments:            Treatment/Activity Tolerance:  [x] Patient tolerated treatment well [] Patient limited by fatique  [] Patient limited by pain  [] Patient limited by other medical complications  [] Other:     Overall Progression Towards Functional goals/ Treatment Progress Update:  [] Patient is progressing as expected towards functional goals listed. [] Progression is slowed due to complexities/Impairments listed. [] Progression has been slowed due to co-morbidities.   [x] Plan just implemented, too soon to assess goals progression <30days   [] Goals require adjustment due to lack of progress  [] Patient is not progressing as expected and requires additional follow up with physician  [] Other    Prognosis for POC: [x] Good [] Fair  [] Poor    Patient requires continued skilled intervention: [x] Yes  [] No        PLAN: See eval  [] Continue per plan of care [] Alter current plan (see comments)  [x] Plan of care initiated [] Hold pending MD visit [] Discharge    Electronically signed by: Farhana Davis, PT    Note: If patient does not return for scheduled/recommended follow up visits, this note will serve as a discharge from care along with the most recent update on progress.

## 2020-07-15 NOTE — PLAN OF CARE
[facility], [place] [address]  Phone [phone]   Fax [phone]                                                       Physical Therapy Certification    Dear Referring Practitioner: Dr. Kadi Smith,    We had the pleasure of evaluating the following patient for physical therapy services at [address] [place] [address]. A summary of our findings can be found in the initial assessment below. This includes our plan of care. If you have any questions or concerns regarding these findings, please do not hesitate to contact me at the office phone number checked above.   Thank you for the referral.       Physician Signature:_______________________________Date:__________________  By signing above (or electronic signature), therapists plan is approved by physician      Patient: Warden Blanchard   : 1960   MRN: [id number]  Referring Physician: Referring Practitioner: Dr. Kadi Smith      Evaluation Date: 7/15/2020      Medical Diagnosis Information:  Diagnosis: Complex tear of medial meniscus of right knee (I76.507N), acute pain of right knee (M25.561); s/p R knee arthroscopy, PMM, synovectomy, chondroplasty of patella, trochlea, medial femoral condyle 20   Treatment Diagnosis: Acute right knee pain                                         Insurance information: PT Insurance Information: [employer] - $3002 OOP max, $70 co-pay (goes towards OOP), 40 PT visits per calendar year     Precautions/ Contra-indications: None    C-SSRS Triggered by Intake questionnaire (Past 2 wk assessment):   [x] No, Questionnaire did not trigger screening.   [] Yes, Patient intake triggered further evaluation      [] C-SSRS Screening completed  [] PCP notified via Plan of Care  [] Emergency services notified     Latex Allergy:  [x]NO      []YES  Preferred Language for Healthcare:   [x]English       []other:    SUBJECTIVE: Patient stated complaint:  Pt reports that he started having R knee pain a few months ago that started with insidious onset. MRI showed meniscus tear. Pt had sx 7/7/20: s/p R knee arthroscopy, PMM, synovectomy, chondroplasty of patella, trochlea and medial femoral condyle. Pt states that knee has been feeling good overall since sx, but did a lot of walking yesterday and knee is very swollen and more painful today. Pt states that he also had quite a bit of redness over entire knee last weekend, [place] originally thought possibly infection but then when he saw pt on Monday, states that he thought this was actually frostbite. Pt currently taking Keflex. Currently, pt c/o soreness on lateral aspect of knee and tightness across anterior knee that is worst first thing in the morning. Pt does occasionally use ace bandage to wrap knee to help minimize swelling. Relevant Medical History: Hx of L knee scope ~15 years ago  Functional Disability Index: LEFS = 37% disability     Pain Scale: 1-5/10  Easing factors: modifying activities, ice  Provocative factors: prolonged standing, prolonged walking    Type: []Constant   [x]Intermittent  []Radiating [x]Localized []other:     Numbness/Tingling: pt denies N/T in R LE    Occupation/School:  - does a lot of standing, walking on concrete floors, and sits in meetings     Living Status/Prior Level of Function: Independent with ADLs and IADLs. Pt enjoys being active and would like to get back to running.      OBJECTIVE:     ROM LEFT RIGHT   HIP Flex     HIP Abd     HIP Ext     HIP IR     HIP ER     Knee ext 0 0   Knee Flex 140 125   Ankle PF     Ankle DF     Ankle In     Ankle Ev     Strength  LEFT RIGHT   HIP Flexors 5 NT   HIP Abductors NT NT   HIP Ext     Hip ER     Knee EXT (quad) 5 NT   Knee Flex (HS) 5 NT   Ankle DF 5 NT   Ankle PF     Ankle Inv     Ankle EV activities   [x]Reduced participation in social activities. [x]Reduced participation in sport/recreation activities. Classification :    [x]Signs/symptoms consistent with post-surgical status including decreased ROM, strength and function. []Signs/symptoms consistent with joint sprain/strain   []Signs/symptoms consistent with patella-femoral syndrome   []Signs/symptoms consistent with knee OA/hip OA   []Signs/symptoms consistent with internal derangement of knee/Hip   []Signs/symptoms consistent with functional hip weakness/NMR control      []Signs/symptoms consistent with tendinitis/tendinosis    []signs/symptoms consistent with pathology which may benefit from Dry needling      []other:      Prognosis/Rehab Potential:      []Excellent   [x]Good    []Fair   []Poor    Tolerance of evaluation/treatment:    []Excellent   [x]Good    []Fair   []Poor    Physical Therapy Evaluation Complexity Justification  [x] A history of present problem with:  [x] no personal factors and/or comorbidities that impact the plan of care;  []1-2 personal factors and/or comorbidities that impact the plan of care  []3 personal factors and/or comorbidities that impact the plan of care  [x] An examination of body systems using standardized tests and measures addressing any of the following: body structures and functions (impairments), activity limitations, and/or participation restrictions;:  [x] a total of 1-2 or more elements   [] a total of 3 or more elements   [] a total of 4 or more elements   [x] A clinical presentation with:  [x] stable and/or uncomplicated characteristics   [] evolving clinical presentation with changing characteristics  [] unstable and unpredictable characteristics;   [x] Clinical decision making of [x] low, [] moderate, [] high complexity using standardized patient assessment instrument and/or measurable assessment of functional outcome.     [x] EVAL (LOW) 34611 (typically 30 minutes face-to-face)  [] EVAL (MOD) 62056 (typically 30 minutes face-to-face)  [] EVAL (HIGH) 57605 (typically 45 minutes face-to-face)  [] RE-EVAL     PLAN:   Frequency/Duration:  2 days per week for 6 Weeks:  Interventions:  [x]  Therapeutic exercise including: strength training, ROM, for Lower extremity and core   [x]  NMR activation and proprioception for LE, Glutes and Core   [x]  Manual therapy as indicated for LE, Hip and spine to include: Dry Needling/IASTM, STM, PROM, Gr I-IV mobilizations, manipulation. [x] Modalities as needed that may include: thermal agents, E-stim, Biofeedback, US, iontophoresis as indicated  [x] Patient education on joint protection, postural re-education, activity modification, progression of HEP. HEP instruction: (see scanned forms)    GOALS:  Patient stated goal: \"To be able to run 2 miles\"  [] Progressing: [] Met: [] Not Met: [] Adjusted    Therapist goals for Patient:   Short Term Goals: To be achieved in: 2 weeks  1. Independent in HEP and progression per patient tolerance, in order to prevent re-injury. [] Progressing: [] Met: [] Not Met: [] Adjusted  2. Patient will have a decrease in pain to facilitate improvement in movement, function, and ADLs as indicated by Functional Deficits. [] Progressing: [] Met: [] Not Met: [] Adjusted    Long Term Goals: To be achieved in: 6 weeks  1. Disability index score of 25% or less for the LEFS to assist with reaching prior level of function. [] Progressing: [] Met: [] Not Met: [] Adjusted  2. Patient will demonstrate increased AROM to WNL for R knee to allow for proper joint functioning as indicated by patients Functional Deficits. [] Progressing: [] Met: [] Not Met: [] Adjusted  3. Patient will demonstrate an increase in Strength to good proximal hip strength and control, within 5lb HHD in LE to allow for proper functional mobility as indicated by patients Functional Deficits. [] Progressing: [] Met: [] Not Met: [] Adjusted  4.  Patient will return to walk for 1 hour without increased symptoms or restriction. [] Progressing: [] Met: [] Not Met: [] Adjusted  5. Patient will be able to squat to pick something up off of the floor without increased symptoms or restriction.      [] Progressing: [] Met: [] Not Met: [] Adjusted     Electronically signed by:  Joyce Barragan PT

## 2020-07-17 ENCOUNTER — APPOINTMENT (OUTPATIENT)
Dept: PHYSICAL THERAPY | Age: 60
End: 2020-07-17
Payer: COMMERCIAL

## 2020-07-20 ENCOUNTER — HOSPITAL ENCOUNTER (OUTPATIENT)
Dept: PHYSICAL THERAPY | Age: 60
Setting detail: THERAPIES SERIES
Discharge: HOME | End: 2020-07-20
Payer: COMMERCIAL

## 2020-07-20 PROCEDURE — 97110 THERAPEUTIC EXERCISES: CPT

## 2020-07-20 PROCEDURE — 97016 VASOPNEUMATIC DEVICE THERAPY: CPT

## 2020-07-20 PROCEDURE — 97140 MANUAL THERAPY 1/> REGIONS: CPT

## 2020-07-20 NOTE — FLOWSHEET NOTE
[facility]    Physical Therapy Treatment Note/ Progress Report:     Date:  2020    Patient Name:  Makenzie Bruno  \"Hayder\"  :  1960  MRN: [id number]  Medical/Treatment Diagnosis Information:  · Diagnosis: Complex tear of medial meniscus of right knee (S83.231D), acute pain of right knee (M25.561); s/p R knee arthroscop, PMM, synovectomy, chondroplasty of patella, trochlea, medial femoral condyle 20  · Treatment Diagnosis: Acute right knee pain  Insurance/Certification information:  PT Insurance Information: [employer] - $9306 OOP max, $70 co-pay (goes towards OOP), 40 PT visits per calendar year  Physician Information:  Referring Practitioner: Dr. Gama Akers of care signed (Y/N):     Date of Patient follow up with Physician: 8/10/20     Progress Report: []  Yes  [x]  No     Functional Scale: LEFS = 37%   Date: 7/15/20    Date Range for reporting period:  Beginnin/15/20  Ending:      Progress report due (10 Rx/or 30 days whichever is less):      Recertification due (POC duration/ or 90 days whichever is less): 20     Visit # Insurance Allowable Auth Needed   2 40 - pending auth [x]Yes    []No     Pain level:  1-5/10     SUBJECTIVE:  Pt chief complaint is distal adductor longus pain and bruising. Pt feels that he wrapped his ACE wrap too tightly and pain is a result of that action. Pt states he is ambulating about 5,000 steps at work and is trying to be conscious on amount of activity in an attempt to decrease R knee swelling.      OBJECTIVE:    Observation:    Test measurements:     2020: moderate lateral/superior patellar swelling and edema    RESTRICTIONS/PRECAUTIONS: S/p R knee arthroscopy, PMM, synovectomy, chondroplasty of patella, trochlea and medial femoral condyle 20    Exercises/Interventions:     Therapeutic Ex 30' Resistance Sets/sec Reps Notes   BIKE   5'      HS stretch EOB  30\" 3 Supine ITB stretch  30\" 3 W/gren strap   Heel slides  10\" 10    Quad sets  5\" 20    SLR abduction  2 10 Cues for LE alignment    SAQ  1# 2 10 ^Wt NPV   Glute Bridge c Band  [place]  2 10 Green for [employer]  2 10 Green for [employer] CC 20# 2 10                                              Manual Intervention 10'       Knee mobs/PROM 6'   Knee flexion mobs   Tib/Fem Mobs       Patella Mobs       Ankle mobs       STM/IASTM 4'   Distal ITB          NMR re-education                                                                          Therapeutic Exercise and NMR EXR  [x] (94313) Provided verbal/tactile cueing for activities related to strengthening, flexibility, endurance, ROM for improvements in LE, proximal hip, and core control with self care, mobility, lifting, ambulation.  [] (20548) Provided verbal/tactile cueing for activities related to improving balance, coordination, kinesthetic sense, posture, motor skill, proprioception  to assist with LE, proximal hip, and core control in self care, mobility, lifting, ambulation and eccentric single leg control.      NMR and Therapeutic Activities:    [] (50295 or 68074) Provided verbal/tactile cueing for activities related to improving balance, coordination, kinesthetic sense, posture, motor skill, proprioception and motor activation to allow for proper function of core, proximal hip and LE with self care and ADLs  [] (18894) Gait Re-education- Provided training and instruction to the patient for proper LE, core and proximal hip recruitment and positioning and eccentric body weight control with ambulation re-education including up and down stairs     Home Exercise Program:    [x] (74097) Reviewed/Progressed HEP activities related to strengthening, flexibility, endurance, ROM of core, proximal hip and LE for functional self-care, mobility, lifting and ambulation/stair navigation   [] (30194)Reviewed/Progressed HEP activities related to improving balance, coordination, kinesthetic sense, posture, motor skill, proprioception of core, proximal hip and LE for self care, mobility, lifting, and ambulation/stair navigation      Manual Treatments:  PROM / STM / Oscillations-Mobs:  G-I, II, III, IV (PA's, Inf., Post.)  [x] (35250) Provided manual therapy to mobilize LE, proximal hip and/or LS spine soft tissue/joints for the purpose of modulating pain, promoting relaxation,  increasing ROM, reducing/eliminating soft tissue swelling/inflammation/restriction, improving soft tissue extensibility and allowing for proper ROM for normal function with self care, mobility, lifting and ambulation. Modalities: 15' game-ready to decrease post-op swelling and inflammation     Charges:  Timed Code Treatment Minutes: 40   Total Treatment Minutes: 55       [] EVAL (LOW) 71217 (typically 20 minutes face-to-face)  [] EVAL (MOD) 97740 (typically 30 minutes face-to-face)  [] EVAL (HIGH) 43502 (typically 45 minutes face-to-face)  [] RE-EVAL     [x] UX(61839) x   2  [] IONTO (63652)  [] NMR (69546) x     [x] VASO (14285)  [x] Manual (79546) x 1    [] Other:  [] TA (80741)x     [] Mech Traction (02201)  [] ES(attended) (59715)     [] ES (un) (69621):     GOALS:   Patient stated goal: \"To be able to run 2 miles\"  []? Progressing: []? Met: []? Not Met: []? Adjusted     Therapist goals for Patient:   Short Term Goals: To be achieved in: 2 weeks  1. Independent in HEP and progression per patient tolerance, in order to prevent re-injury. []? Progressing: []? Met: []? Not Met: []? Adjusted  2. Patient will have a decrease in pain to facilitate improvement in movement, function, and ADLs as indicated by Functional Deficits. []? Progressing: []? Met: []? Not Met: []? Adjusted     Long Term Goals: To be achieved in: 6 weeks  1. Disability index score of 25% or less for the LEFS to assist with reaching prior level of function. []? Progressing: []? Met: []? Not Met: []? Adjusted  2.  Patient will demonstrate increased AROM to WNL for R knee to allow for proper joint functioning as indicated by patients Functional Deficits. []? Progressing: []? Met: []? Not Met: []? Adjusted  3. Patient will demonstrate an increase in Strength to good proximal hip strength and control, within 5lb HHD in LE to allow for proper functional mobility as indicated by patients Functional Deficits. []? Progressing: []? Met: []? Not Met: []? Adjusted  4. Patient will return to walk for 1 hour without increased symptoms or restriction. []? Progressing: []? Met: []? Not Met: []? Adjusted  5. Patient will be able to squat to pick something up off of the floor without increased symptoms or restriction. []? Progressing: []? Met: []? Not Met: []? Adjusted         Progression Towards Functional goals:  [] Patient is progressing as expected towards functional goals listed. [] Progression is slowed due to complexities listed. [] Progression has been slowed due to co-morbidities. [x] Plan just implemented, too soon to assess goals progression  [] Other:     ASSESSMENT: Pt given updated HEP to increase glute strength and endurance to improve LE function and stability during ADLs. Pt required max tactile and verbal cueing for SL ABD to increase glute med facilitation and improve exercise effectiveness. Pt advised to use R knee symptoms as a guide for activity as pt was instructed he may be doing too much outside of skilled therapy 2/2 significant supra/lateral knee swelling and edema. Pt was fatigued in glute med musculature at end of therapy session and would benefit from increased strengthening to return pt to PLOF. Pt demonstrates good active quad contraction at this date.      Return to Play: (if applicable)   []  Stage 1: Intro to Strength   []  Stage 2: Return to Run and Strength   []  Stage 3: Return to Jump and Strength   []  Stage 4: Dynamic Strength and Agility   []  Stage 5: Sport Specific Training     []  Ready to Return to Play, Meets [employer] Stages   []  Not Ready for Return to Sports   Comments:            Treatment/Activity Tolerance:  [x] Patient tolerated treatment well [] Patient limited by fatique  [] Patient limited by pain  [] Patient limited by other medical complications  [] Other:     Overall Progression Towards Functional goals/ Treatment Progress Update:  [] Patient is progressing as expected towards functional goals listed. [] Progression is slowed due to complexities/Impairments listed. [] Progression has been slowed due to co-morbidities. [x] Plan just implemented, too soon to assess goals progression <30days   [] Goals require adjustment due to lack of progress  [] Patient is not progressing as expected and requires additional follow up with physician  [] Other    Prognosis for POC: [x] Good [] Fair  [] Poor    Patient requires continued skilled intervention: [x] Yes  [] No        PLAN: Continue with LE strengthening to improve endurance and stability throughout R knee joint to improve pt's quality of life. [] Continue per plan of care [] Alter current plan (see comments)  [x] Plan of care initiated [] Hold pending MD visit [] Discharge    Electronically signed by: Wilder Howard, PTA [id number]    Note: If patient does not return for scheduled/recommended follow up visits, this note will serve as a discharge from care along with the most recent update on progress.

## 2020-07-23 ENCOUNTER — HOSPITAL ENCOUNTER (OUTPATIENT)
Dept: PHYSICAL THERAPY | Age: 60
Setting detail: THERAPIES SERIES
Discharge: HOME | End: 2020-07-23
Payer: COMMERCIAL

## 2020-07-23 NOTE — FLOWSHEET NOTE
[facility]    Physical Therapy Treatment Note/ Progress Report:     Date:  2020    Patient Name:  Petey Welch  \"Hayder\"  :  1960  MRN: [id number]  Medical/Treatment Diagnosis Information:  · Diagnosis: Complex tear of medial meniscus of right knee (S83.231D), acute pain of right knee (M25.561); s/p R knee arthroscop, PMM, synovectomy, chondroplasty of patella, trochlea, medial femoral condyle 20  · Treatment Diagnosis: Acute right knee pain  Insurance/Certification information:  PT Insurance Information: [employer] - $7211 OOP max, $70 co-pay (goes towards OOP), 40 PT visits per calendar year  Physician Information:  Referring Practitioner: Dr. Kaycee Munoz of care signed (Y/N):     Date of Patient follow up with Physician: 8/10/20     Progress Report: []  Yes  [x]  No     Functional Scale: LEFS = 37%   Date: 7/15/20    Date Range for reporting period:  Beginnin/15/20  Ending:      Progress report due (10 Rx/or 30 days whichever is less):      Recertification due (POC duration/ or 90 days whichever is less): 20     Visit # Insurance Allowable Auth Needed   2 40 - pending auth [x]Yes    []No     Pain level:  1-5/10     SUBJECTIVE:  Pt RT knee continues to have swelling across the superior aspect of the knee and it fluctuates up/down. Has increased discomfort and stiffness with prolonged sitting with job demands but otherwise is walking lots of steps for work. OBJECTIVE:    Observation:    Test measurements:     2020: moderate lateral/superior patellar swelling and edema   20 RT knee moderate amount of edema and ballotable patella, fluid on the knee presentation.  Fair quad contraction but increased stiffness present with attempting knee flexion    RESTRICTIONS/PRECAUTIONS: S/p R knee arthroscopy, PMM, synovectomy, chondroplasty of patella, trochlea and medial femoral condyle 7/7/20    Exercises/Interventions: HOLD PROGRAM due to aspiration  Therapeutic Ex  Resistance Sets/sec Reps Notes   BIKE   5'      HS stretch EOB  30\" 3    Supine ITB stretch  30\" 3 W/gren strap   Heel slides  10\" 10    Quad sets  5\" 20    SLR abduction  2 10 Cues for LE alignment    SAQ  1# 2 10 ^Wt NPV   Glute Bridge c Band  [place]  2 10 Green for [employer]  2 10 Green for HEP   TKE CC 20# 2 10                                              Manual Intervention        Knee mobs/PROM Tib/Fem Mobs Patella Mobs Ankle mobs STM/IASTM        NMR re-education                                                                          Therapeutic Exercise and NMR EXR  [x] (98935) Provided verbal/tactile cueing for activities related to strengthening, flexibility, endurance, ROM for improvements in LE, proximal hip, and core control with self care, mobility, lifting, ambulation.  [] (76938) Provided verbal/tactile cueing for activities related to improving balance, coordination, kinesthetic sense, posture, motor skill, proprioception  to assist with LE, proximal hip, and core control in self care, mobility, lifting, ambulation and eccentric single leg control.      NMR and Therapeutic Activities:    [] (73991 or 57834) Provided verbal/tactile cueing for activities related to improving balance, coordination, kinesthetic sense, posture, motor skill, proprioception and motor activation to allow for proper function of core, proximal hip and LE with self care and ADLs  [] (76425) Gait Re-education- Provided training and instruction to the patient for proper LE, core and proximal hip recruitment and positioning and eccentric body weight control with ambulation re-education including up and down stairs     Home Exercise Program:    [x] (40992) Reviewed/Progressed HEP activities related to strengthening, flexibility, endurance, ROM of core, proximal hip and LE for functional self-care, mobility, lifting and ambulation/stair navigation   [] (74862)Reviewed/Progressed HEP activities related to improving balance, coordination, kinesthetic sense, posture, motor skill, proprioception of core, proximal hip and LE for self care, mobility, lifting, and ambulation/stair navigation      Manual Treatments:  PROM / STM / Oscillations-Mobs:  G-I, II, III, IV (PA's, Inf., Post.)  [x] (77064) Provided manual therapy to mobilize LE, proximal hip and/or LS spine soft tissue/joints for the purpose of modulating pain, promoting relaxation,  increasing ROM, reducing/eliminating soft tissue swelling/inflammation/restriction, improving soft tissue extensibility and allowing for proper ROM for normal function with self care, mobility, lifting and ambulation. Modalities: 15' game-ready to decrease post-op swelling and inflammation     Charges:  Timed Code Treatment Minutes: Total Treatment Minutes:        [] EVAL (LOW) 60489 (typically 20 minutes face-to-face)  [] EVAL (MOD) 45015 (typically 30 minutes face-to-face)  [] EVAL (HIGH) 00201 (typically 45 minutes face-to-face)  [] RE-EVAL     [] FZ(34526) x   2  [] IONTO (39800)  [] NMR (36490) x     [] VASO (16696)  [] Manual (78158) x 1    [] Other:  [] TA (93061)x     [] Mech Traction (33702)  [] ES(attended) (30720)     [] ES (un) (60614):     GOALS:   Patient stated goal: \"To be able to run 2 miles\"  []? Progressing: []? Met: []? Not Met: []? Adjusted     Therapist goals for Patient:   Short Term Goals: To be achieved in: 2 weeks  1. Independent in HEP and progression per patient tolerance, in order to prevent re-injury. []? Progressing: []? Met: []? Not Met: []? Adjusted  2. Patient will have a decrease in pain to facilitate improvement in movement, function, and ADLs as indicated by Functional Deficits. []? Progressing: []? Met: []? Not Met: []? Adjusted     Long Term Goals: To be achieved in: 6 weeks  1.  Disability index score of 25% or less for the LEFS to assist with reaching prior level of

## 2020-07-27 ENCOUNTER — HOSPITAL ENCOUNTER (OUTPATIENT)
Dept: PHYSICAL THERAPY | Age: 60
Setting detail: THERAPIES SERIES
Discharge: HOME | End: 2020-07-27
Payer: COMMERCIAL

## 2020-07-27 PROCEDURE — 97110 THERAPEUTIC EXERCISES: CPT

## 2020-07-27 PROCEDURE — 97140 MANUAL THERAPY 1/> REGIONS: CPT

## 2020-07-27 PROCEDURE — 97016 VASOPNEUMATIC DEVICE THERAPY: CPT

## 2020-07-27 NOTE — FLOWSHEET NOTE
Progressing: []? Met: []? Not Met: []? Adjusted     Long Term Goals: To be achieved in: 6 weeks  1. Disability index score of 25% or less for the LEFS to assist with reaching prior level of function. []? Progressing: []? Met: []? Not Met: []? Adjusted  2. Patient will demonstrate increased AROM to WNL for R knee to allow for proper joint functioning as indicated by patients Functional Deficits. []? Progressing: []? Met: []? Not Met: []? Adjusted  3. Patient will demonstrate an increase in Strength to good proximal hip strength and control, within 5lb HHD in LE to allow for proper functional mobility as indicated by patients Functional Deficits. []? Progressing: []? Met: []? Not Met: []? Adjusted  4. Patient will return to walk for 1 hour without increased symptoms or restriction. []? Progressing: []? Met: []? Not Met: []? Adjusted  5. Patient will be able to squat to pick something up off of the floor without increased symptoms or restriction. []? Progressing: []? Met: []? Not Met: []? Adjusted         Progression Towards Functional goals:  [] Patient is progressing as expected towards functional goals listed. [] Progression is slowed due to complexities listed. [] Progression has been slowed due to co-morbidities. [x] Plan just implemented, too soon to assess goals progression  [] Other:     ASSESSMENT:  Pt demonstrated improved knee AROM today with decreased overall swelling noted. Pt continues to demonstrate swelling over suprapatella, particularly noted with quad sets, so this was held today. Pt was able to return to full program without increased patellar swelling. Educated pt to continue use of ice and ace bandage, and to monitor amount of activity/walking performed in order to decrease stress to knee joint.        Return to Play: (if applicable)   []  Stage 1: Intro to Strength   []  Stage 2: Return to Run and Strength   []  Stage 3: Return to Jump and Strength   []  Stage 4: Dynamic Strength and Agility   []  Stage 5: Sport Specific Training     []  Ready to Return to Play, [employer]   []  Not Ready for Return to Sports   Comments:            Treatment/Activity Tolerance:  [x] Patient tolerated treatment well [] Patient limited by fatique  [x] Patient limited by pain  [] Patient limited by other medical complications  [] Other:     Overall Progression Towards Functional goals/ Treatment Progress Update:  [] Patient is progressing as expected towards functional goals listed. [] Progression is slowed due to complexities/Impairments listed. [] Progression has been slowed due to co-morbidities. [x] Plan just implemented, too soon to assess goals progression <30days   [] Goals require adjustment due to lack of progress  [] Patient is not progressing as expected and requires additional follow up with physician  [] Other    Prognosis for POC: [x] Good [] Fair  [] Poor    Patient requires continued skilled intervention: [x] Yes  [] No        PLAN:  Pt to progress back into HEP as able and to monitor amount of swelling in knee. [x] Continue per plan of care [] Alter current plan (see comments)  [] Plan of care initiated [] Hold pending MD visit [] Discharge    Electronically signed by: Tanya Graham PT   Note: If patient does not return for scheduled/recommended follow up visits, this note will serve as a discharge from care along with the most recent update on progress.

## 2020-07-29 ENCOUNTER — HOSPITAL ENCOUNTER (OUTPATIENT)
Dept: PHYSICAL THERAPY | Age: 60
Setting detail: THERAPIES SERIES
Discharge: HOME | End: 2020-07-29
Payer: COMMERCIAL

## 2020-07-29 PROCEDURE — 97140 MANUAL THERAPY 1/> REGIONS: CPT

## 2020-07-29 PROCEDURE — 97110 THERAPEUTIC EXERCISES: CPT

## 2020-07-29 PROCEDURE — 97016 VASOPNEUMATIC DEVICE THERAPY: CPT

## 2020-07-29 NOTE — FLOWSHEET NOTE
[facility]    Physical Therapy Treatment Note/ Progress Report:     Date:  2020    Patient Name:  Froylan Kelley  \"Hayder\"  :  1960  MRN: [id number]  Medical/Treatment Diagnosis Information:  · Diagnosis: Complex tear of medial meniscus of right knee (S83.231D), acute pain of right knee (M25.561); s/p R knee arthroscop, PMM, synovectomy, chondroplasty of patella, trochlea, medial femoral condyle 20  · Treatment Diagnosis: Acute right knee pain  Insurance/Certification information:  PT Insurance Information: [employer] - $5802 OOP max, $70 co-pay (goes towards OOP), 40 PT visits per calendar year  Physician Information:  Referring Practitioner: Dr. Jamison Snyder of care signed (Y/N):     Date of Patient follow up with Physician: 8/10/20     Progress Report: []  Yes  [x]  No     Functional Scale: LEFS = 37%   Date: 7/15/20    Date Range for reporting period:  Beginnin/15/20  Ending:      Progress report due (10 Rx/or 30 days whichever is less): 91     Recertification due (POC duration/ or 90 days whichever is less): 20     Visit # Insurance Allowable Auth Needed   4 8 visits approved 7/15/20 - 20 [x]Yes    []No     Pain level:  1-5/10     SUBJECTIVE:  Pt reports that knee feels pretty good overall, but pt's c/c is still swelling in the knee that seems to localize over the patella. Pt states that he wears ace wrap during the day and feels like this helps, but as soon as he removes the ace wrap the swelling seems to come right back. OBJECTIVE:    Observation:    Test measurements:     2020: moderate lateral/superior patellar swelling and edema   20 RT knee moderate amount of edema and ballotable patella, fluid on the knee presentation.  Fair quad contraction but increased stiffness present with attempting knee flexion    RESTRICTIONS/PRECAUTIONS: S/p R knee arthroscopy, PMM, synovectomy, up and down stairs     Home Exercise Program:    [x] (46405) Reviewed/Progressed HEP activities related to strengthening, flexibility, endurance, ROM of core, proximal hip and LE for functional self-care, mobility, lifting and ambulation/stair navigation   [] (26471)Reviewed/Progressed HEP activities related to improving balance, coordination, kinesthetic sense, posture, motor skill, proprioception of core, proximal hip and LE for self care, mobility, lifting, and ambulation/stair navigation      Manual Treatments:  PROM / STM / Oscillations-Mobs:  G-I, II, III, IV (PA's, Inf., Post.)  [x] (42001) Provided manual therapy to mobilize LE, proximal hip and/or LS spine soft tissue/joints for the purpose of modulating pain, promoting relaxation,  increasing ROM, reducing/eliminating soft tissue swelling/inflammation/restriction, improving soft tissue extensibility and allowing for proper ROM for normal function with self care, mobility, lifting and ambulation. Modalities: 15' game-ready to decrease post-op swelling and inflammation -(high compression)    Charges:  Timed Code Treatment Minutes: 45   Total Treatment Minutes: 60       [] EVAL (LOW) 92511 (typically 20 minutes face-to-face)  [] EVAL (MOD) 83172 (typically 30 minutes face-to-face)  [] EVAL (HIGH) 01213 (typically 45 minutes face-to-face)  [] RE-EVAL     [x] TU(68553) x   2  [] IONTO (11160)  [] NMR (50627) x     [x] VASO (39998)  [x] Manual (17319) x 1    [] Other:  [] TA (85471)x     [] Mech Traction (61309)  [] ES(attended) (37904)     [] ES (un) (50655):     GOALS:   Patient stated goal: \"To be able to run 2 miles\"  []? Progressing: []? Met: []? Not Met: []? Adjusted     Therapist goals for Patient:   Short Term Goals: To be achieved in: 2 weeks  1. Independent in HEP and progression per patient tolerance, in order to prevent re-injury. []? Progressing: []? Met: []? Not Met: []? Adjusted  2.  Patient will have a decrease in pain to facilitate improvement in movement, function, and ADLs as indicated by Functional Deficits. []? Progressing: []? Met: []? Not Met: []? Adjusted     Long Term Goals: To be achieved in: 6 weeks  1. Disability index score of 25% or less for the LEFS to assist with reaching prior level of function. []? Progressing: []? Met: []? Not Met: []? Adjusted  2. Patient will demonstrate increased AROM to WNL for R knee to allow for proper joint functioning as indicated by patients Functional Deficits. []? Progressing: []? Met: []? Not Met: []? Adjusted  3. Patient will demonstrate an increase in Strength to good proximal hip strength and control, within 5lb HHD in LE to allow for proper functional mobility as indicated by patients Functional Deficits. []? Progressing: []? Met: []? Not Met: []? Adjusted  4. Patient will return to walk for 1 hour without increased symptoms or restriction. []? Progressing: []? Met: []? Not Met: []? Adjusted  5. Patient will be able to squat to pick something up off of the floor without increased symptoms or restriction. []? Progressing: []? Met: []? Not Met: []? Adjusted         Progression Towards Functional goals:  [] Patient is progressing as expected towards functional goals listed. [] Progression is slowed due to complexities listed. [] Progression has been slowed due to co-morbidities. [x] Plan just implemented, too soon to assess goals progression  [] Other:     ASSESSMENT:  Pt demonstrates increased patellar swelling with terminal knee extension positions. Modified some exercises to avoid terminal knee extension positions today decreased swelling noted at end of session today. Added lateral bandwalking and tandem stance for improved gluteus medius activation.      Return to Play: (if applicable)   []  Stage 1: Intro to Strength   []  Stage 2: Return to Run and Strength   []  Stage 3: Return to Jump and Strength   []  Stage 4: Dynamic Strength and Agility   []  Stage 5: Sport Specific Training     []  Ready to Return to Play, Meets All Above Stages   []  Not Ready for Return to Sports   Comments:            Treatment/Activity Tolerance:  [x] Patient tolerated treatment well [] Patient limited by fatique  [x] Patient limited by swelling  [] Patient limited by other medical complications  [] Other:     Overall Progression Towards Functional goals/ Treatment Progress Update:  [] Patient is progressing as expected towards functional goals listed. [] Progression is slowed due to complexities/Impairments listed. [] Progression has been slowed due to co-morbidities. [x] Plan just implemented, too soon to assess goals progression <30days   [] Goals require adjustment due to lack of progress  [] Patient is not progressing as expected and requires additional follow up with physician  [] Other    Prognosis for POC: [x] Good [] Fair  [] Poor    Patient requires continued skilled intervention: [x] Yes  [] No        PLAN:  Pt to progress back into HEP as able and to monitor amount of swelling in knee. [x] Continue per plan of care [] Alter current plan (see comments)  [] Plan of care initiated [] Hold pending MD visit [] Discharge    Electronically signed by: Susan Powers PT   Note: If patient does not return for scheduled/recommended follow up visits, this note will serve as a discharge from care along with the most recent update on progress.

## 2020-08-04 ENCOUNTER — HOSPITAL ENCOUNTER (OUTPATIENT)
Dept: PHYSICAL THERAPY | Age: 60
Setting detail: THERAPIES SERIES
Discharge: HOME | End: 2020-08-04
Payer: COMMERCIAL

## 2020-08-04 PROCEDURE — 97016 VASOPNEUMATIC DEVICE THERAPY: CPT

## 2020-08-04 PROCEDURE — 97110 THERAPEUTIC EXERCISES: CPT

## 2020-08-04 PROCEDURE — 97140 MANUAL THERAPY 1/> REGIONS: CPT

## 2020-08-04 NOTE — FLOWSHEET NOTE
stairs     Home Exercise Program:    [x] (19917) Reviewed/Progressed HEP activities related to strengthening, flexibility, endurance, ROM of core, proximal hip and LE for functional self-care, mobility, lifting and ambulation/stair navigation   [] (09573)Reviewed/Progressed HEP activities related to improving balance, coordination, kinesthetic sense, posture, motor skill, proprioception of core, proximal hip and LE for self care, mobility, lifting, and ambulation/stair navigation      Manual Treatments:  PROM / STM / Oscillations-Mobs:  G-I, II, III, IV (PA's, Inf., Post.)  [x] (71783) Provided manual therapy to mobilize LE, proximal hip and/or LS spine soft tissue/joints for the purpose of modulating pain, promoting relaxation,  increasing ROM, reducing/eliminating soft tissue swelling/inflammation/restriction, improving soft tissue extensibility and allowing for proper ROM for normal function with self care, mobility, lifting and ambulation. Modalities: 15' game-ready to decrease post-op swelling and inflammation -(high compression)    Charges:  Timed Code Treatment Minutes: 45   Total Treatment Minutes: 60       [] EVAL (LOW) 28648 (typically 20 minutes face-to-face)  [] EVAL (MOD) 96865 (typically 30 minutes face-to-face)  [] EVAL (HIGH) 46234 (typically 45 minutes face-to-face)  [] RE-EVAL     [x] JR(03224) x   2  [] IONTO (48343)  [] NMR (39568) x     [x] VASO (44127)  [x] Manual (83119) x 1    [] Other:  [] TA (41513)x     [] Mech Traction (34034)  [] ES(attended) (71559)     [] ES (un) (33862):     GOALS:   Patient stated goal: \"To be able to run 2 miles\"  []? Progressing: []? Met: []? Not Met: []? Adjusted     Therapist goals for Patient:   Short Term Goals: To be achieved in: 2 weeks  1. Independent in HEP and progression per patient tolerance, in order to prevent re-injury. []? Progressing: []? Met: []? Not Met: []? Adjusted  2.  Patient will have a decrease in pain to facilitate improvement in Ready to Return to Play, Meets All Above Stages   []  Not Ready for Return to Sports   Comments:            Treatment/Activity Tolerance:  [x] Patient tolerated treatment well [] Patient limited by fatique  [x] Patient limited by swelling  [] Patient limited by other medical complications  [] Other:     Overall Progression Towards Functional goals/ Treatment Progress Update:  [] Patient is progressing as expected towards functional goals listed. [] Progression is slowed due to complexities/Impairments listed. [] Progression has been slowed due to co-morbidities. [x] Plan just implemented, too soon to assess goals progression <30days   [] Goals require adjustment due to lack of progress  [] Patient is not progressing as expected and requires additional follow up with physician  [] Other    Prognosis for POC: [x] Good [] Fair  [] Poor    Patient requires continued skilled intervention: [x] Yes  [] No        PLAN:  Pt to progress back into HEP as able and to monitor amount of swelling in knee. [x] Continue per plan of care [] Alter current plan (see comments)  [] Plan of care initiated [] Hold pending MD visit [] Discharge    Electronically signed by: Sandra Mckeon PT   Note: If patient does not return for scheduled/recommended follow up visits, this note will serve as a discharge from care along with the most recent update on progress.

## 2020-08-06 ENCOUNTER — HOSPITAL ENCOUNTER (OUTPATIENT)
Dept: PHYSICAL THERAPY | Age: 60
Setting detail: THERAPIES SERIES
Discharge: HOME | End: 2020-08-06
Payer: COMMERCIAL

## 2020-08-06 PROCEDURE — 97016 VASOPNEUMATIC DEVICE THERAPY: CPT

## 2020-08-06 PROCEDURE — 97110 THERAPEUTIC EXERCISES: CPT

## 2020-08-06 PROCEDURE — 97140 MANUAL THERAPY 1/> REGIONS: CPT

## 2020-08-06 NOTE — FLOWSHEET NOTE
[facility]    Physical Therapy Treatment Note/ Progress Report:     Date:  2020    Patient Name:  Kianna Echols  \"Hayder\"  :  1960  MRN: [id number]  Medical/Treatment Diagnosis Information:  · Diagnosis: Complex tear of medial meniscus of right knee (S83.231D), acute pain of right knee (M25.561); s/p R knee arthroscop, PMM, synovectomy, chondroplasty of patella, trochlea, medial femoral condyle 20  · Treatment Diagnosis: Acute right knee pain  Insurance/Certification information:  PT Insurance Information: [employer] - $6735 OOP max, $70 co-pay (goes towards OOP), 40 PT visits per calendar year  Physician Information:  Referring Practitioner: Dr. Nell Leiva of care signed (Y/N):     Date of Patient follow up with Physician: 8/10/20     Progress Report: []  Yes  [x]  No     Functional Scale: LEFS = 37%   Date: 7/15/20    Date Range for reporting period:  Beginnin/15/20  Ending:      Progress report due (10 Rx/or 30 days whichever is less): 3/07/55     Recertification due (POC duration/ or 90 days whichever is less): 20     Visit # Insurance Allowable Auth Needed   6 8 visits approved 7/15/20 - 20 [x]Yes    []No     Pain level:  0/10     SUBJECTIVE:  Pt states that his knee seems to be feeling pretty good overall, just has some discomfort and tightness on lateral aspect of his knee. OBJECTIVE:    Observation:    Test measurements:     2020: moderate lateral/superior patellar swelling and edema   20 RT knee moderate amount of edema and ballotable patella, fluid on the knee presentation.  Fair quad contraction but increased stiffness present with attempting knee flexion    RESTRICTIONS/PRECAUTIONS: S/p R knee arthroscopy, PMM, synovectomy, chondroplasty of patella, trochlea and medial femoral condyle 20    Exercises/Interventions:   Therapeutic Ex 30' Resistance Sets/sec Reps Notes   BIKE   5' HS stretch EOB  30\" 3    Prone quad stretch  30\" 3    Foam roller - ITB    For HEP   Heel slides     Quad sets     SLR flex/abd  2 10 Monitor patellar swelling with flex   SAQ     SB bridges  2 10    S/L clamshell teal 2 10    HS curls DL 30# 2 10    Leg press DL  Leg press ecc 85#  65# 2  2 10  10    Incline calf stretch  30\" 3    Knee extension iso's \"F\" 10\" 10    Lateral bandwalking teal 1 3 laps Band around shins   Wall sits  20\" 3    SAMANTHA abd 60# 2 10 B   TRX squats  2 10           Manual Intervention 10'       Knee mobs/PROM 5'   Knee flexion mobs   Tib/Fem Mobs       Patella Mobs       Ankle mobs       STM/roller stick 5'   Distal ITB          NMR re-education 5'              Modified fwd bosu lunges  5\" 15x    Tandem stance    SLS ground 30\" 3x                                           Therapeutic Exercise and NMR EXR  [x] (78306) Provided verbal/tactile cueing for activities related to strengthening, flexibility, endurance, ROM for improvements in LE, proximal hip, and core control with self care, mobility, lifting, ambulation.  [] (35460) Provided verbal/tactile cueing for activities related to improving balance, coordination, kinesthetic sense, posture, motor skill, proprioception  to assist with LE, proximal hip, and core control in self care, mobility, lifting, ambulation and eccentric single leg control.      NMR and Therapeutic Activities:    [] (54542 or 20035) Provided verbal/tactile cueing for activities related to improving balance, coordination, kinesthetic sense, posture, motor skill, proprioception and motor activation to allow for proper function of core, proximal hip and LE with self care and ADLs  [] (53145) Gait Re-education- Provided training and instruction to the patient for proper LE, core and proximal hip recruitment and positioning and eccentric body weight control with ambulation re-education including up and down stairs     Home Exercise Program:    [x] (71662) Reviewed/Progressed Progressing: []? Met: []? Not Met: []? Adjusted     Long Term Goals: To be achieved in: 6 weeks  1. Disability index score of 25% or less for the LEFS to assist with reaching prior level of function. []? Progressing: []? Met: []? Not Met: []? Adjusted  2. Patient will demonstrate increased AROM to WNL for R knee to allow for proper joint functioning as indicated by patients Functional Deficits. []? Progressing: []? Met: []? Not Met: []? Adjusted  3. Patient will demonstrate an increase in Strength to good proximal hip strength and control, within 5lb HHD in LE to allow for proper functional mobility as indicated by patients Functional Deficits. []? Progressing: []? Met: []? Not Met: []? Adjusted  4. Patient will return to walk for 1 hour without increased symptoms or restriction. []? Progressing: []? Met: []? Not Met: []? Adjusted  5. Patient will be able to squat to pick something up off of the floor without increased symptoms or restriction. []? Progressing: []? Met: []? Not Met: []? Adjusted         Progression Towards Functional goals:  [] Patient is progressing as expected towards functional goals listed. [] Progression is slowed due to complexities listed. [] Progression has been slowed due to co-morbidities. [x] Plan just implemented, too soon to assess goals progression  [] Other:     ASSESSMENT:  Continued TTP and tightness noted at distal ITB insertion. Demonstrated foam roller for ITB for HEP. Added HS curls, prone quad stretch and SLS with no increased knee pain noted.       Return to Play: (if applicable)   []  Stage 1: Intro to Strength   []  Stage 2: Return to Run and Strength   []  Stage 3: Return to Jump and Strength   []  Stage 4: Dynamic Strength and Agility   []  Stage 5: Sport Specific Training     []  Ready to Return to Play, Meets All Above Stages   []  Not Ready for Return to Sports   Comments:            Treatment/Activity Tolerance:  [x] Patient tolerated treatment well [] Patient limited by fatique  [x] Patient limited by swelling  [] Patient limited by other medical complications  [] Other:     Overall Progression Towards Functional goals/ Treatment Progress Update:  [] Patient is progressing as expected towards functional goals listed. [] Progression is slowed due to complexities/Impairments listed. [] Progression has been slowed due to co-morbidities. [x] Plan just implemented, too soon to assess goals progression <30days   [] Goals require adjustment due to lack of progress  [] Patient is not progressing as expected and requires additional follow up with physician  [] Other    Prognosis for POC: [x] Good [] Fair  [] Poor    Patient requires continued skilled intervention: [x] Yes  [] No        PLAN:  Pt to progress back into HEP as able and to monitor amount of swelling in knee. [x] Continue per plan of care [] Alter current plan (see comments)  [] Plan of care initiated [] Hold pending MD visit [] Discharge    Electronically signed by: Hossein Maguire, PT   Note: If patient does not return for scheduled/recommended follow up visits, this note will serve as a discharge from care along with the most recent update on progress.

## 2020-08-10 ENCOUNTER — OFFICE VISIT (OUTPATIENT)
Dept: ORTHOPEDIC SURGERY | Age: 60
End: 2020-08-10

## 2020-08-10 ENCOUNTER — HOSPITAL ENCOUNTER (OUTPATIENT)
Dept: PHYSICAL THERAPY | Age: 60
Setting detail: THERAPIES SERIES
Discharge: HOME | End: 2020-08-10
Payer: COMMERCIAL

## 2020-08-10 VITALS — HEIGHT: 70 IN | TEMPERATURE: 97.5 F | WEIGHT: 172 LBS | BODY MASS INDEX: 24.62 KG/M2

## 2020-08-10 PROCEDURE — 97016 VASOPNEUMATIC DEVICE THERAPY: CPT

## 2020-08-10 PROCEDURE — 99024 POSTOP FOLLOW-UP VISIT: CPT | Performed by: ORTHOPAEDIC SURGERY

## 2020-08-10 PROCEDURE — 97112 NEUROMUSCULAR REEDUCATION: CPT

## 2020-08-10 PROCEDURE — 97140 MANUAL THERAPY 1/> REGIONS: CPT

## 2020-08-10 PROCEDURE — 97110 THERAPEUTIC EXERCISES: CPT

## 2020-08-10 NOTE — FLOWSHEET NOTE
[facility]    Physical Therapy Treatment Note/ Progress Report:     Date:  8/10/2020    Patient Name:  Raymond Goldberg  \"Hayder\"  :  1960  MRN: [id number]  Medical/Treatment Diagnosis Information:  · Diagnosis: Complex tear of medial meniscus of right knee (S83.231D), acute pain of right knee (M25.561); s/p R knee arthroscop, PMM, synovectomy, chondroplasty of patella, trochlea, medial femoral condyle 20  · Treatment Diagnosis: Acute right knee pain  Insurance/Certification information:  PT Insurance Information: [employer] - $5238 OOP max, $70 co-pay (goes towards OOP), 40 PT visits per calendar year  Physician Information:  Referring Practitioner: Dr. Alejandra Garcia of care signed (Y/N):     Date of Patient follow up with Physician: 8/10/20     Progress Report: []  Yes  [x]  No     Functional Scale: LEFS = 37%   Date: 7/15/20    Date Range for reporting period:  Beginnin/15/20  Ending:      Progress report due (10 Rx/or 30 days whichever is less):      Recertification due (POC duration/ or 90 days whichever is less): 20     Visit # Insurance Allowable Auth Needed   7 8 visits approved 7/15/20 - 20 [x]Yes    []No     Pain level:  0/10     SUBJECTIVE:  Pt states that his knee continues to feel better every day. Pt still has some swelling, but improved since last visit. Pt states that he has been able to walk up steps with reciprocal pattern, but using step to pattern going down steps. Pt states that he also had trouble stepping up into his boat this weekend due to R lateral knee pain. OBJECTIVE:    Observation:    Test measurements:     2020: moderate lateral/superior patellar swelling and edema   20 RT knee moderate amount of edema and ballotable patella, fluid on the knee presentation.  Fair quad contraction but increased stiffness present with attempting knee flexion    RESTRICTIONS/PRECAUTIONS: S/p R knee arthroscopy, PMM, synovectomy, chondroplasty of patella, trochlea and medial femoral condyle 7/7/20    Exercises/Interventions:   Therapeutic Ex 27' Resistance Sets/sec Reps Notes   BIKE   5'      HS stretch EOB  30\" 3    Prone quad stretch  30\" 3 Manual    Foam roller - ITB    For HEP   Heel slides     Quad sets     SLR flex/abd  2 10 Monitor patellar swelling with flex   SAQ     SB bridges  2 10    S/L clamshell teal 2 10    HS curls ecc 30# 2 10    Leg press ecc  Leg press SL 65#  60# 2  2 10  10    Incline calf stretch  30\" 3    Step up and overs 4\" 2 10    Lateral bandwalking teal 1 3 laps Band around shins   Wall sits  20\" 3    SAMANTHA abd 60# 2 10 B   TRX squats  2 10           Manual Intervention 10'       Knee mobs/PROM 5'   Knee flexion mobs   Tib/Fem Mobs       Patella Mobs       Ankle mobs       STM/roller stick 5'   Distal ITB          NMR re-education 8'              Modified fwd bosu lunges  5\" 15x    Tandem stance    SLS airex 30\" 3x    Retro slider lunges  1 15    LSD    NPV? Therapeutic Exercise and NMR EXR  [x] (96699) Provided verbal/tactile cueing for activities related to strengthening, flexibility, endurance, ROM for improvements in LE, proximal hip, and core control with self care, mobility, lifting, ambulation.  [] (96961) Provided verbal/tactile cueing for activities related to improving balance, coordination, kinesthetic sense, posture, motor skill, proprioception  to assist with LE, proximal hip, and core control in self care, mobility, lifting, ambulation and eccentric single leg control.      NMR and Therapeutic Activities:    [] (48706 or 34942) Provided verbal/tactile cueing for activities related to improving balance, coordination, kinesthetic sense, posture, motor skill, proprioception and motor activation to allow for proper function of core, proximal hip and LE with self care and ADLs  [] (10371) Gait Re-education- Provided training and instruction to the patient for proper LE, core and proximal hip recruitment and positioning and eccentric body weight control with ambulation re-education including up and down stairs     Home Exercise Program:    [x] (21440) Reviewed/Progressed HEP activities related to strengthening, flexibility, endurance, ROM of core, proximal hip and LE for functional self-care, mobility, lifting and ambulation/stair navigation   [] (73600)Reviewed/Progressed HEP activities related to improving balance, coordination, kinesthetic sense, posture, motor skill, proprioception of core, proximal hip and LE for self care, mobility, lifting, and ambulation/stair navigation      Manual Treatments:  PROM / STM / Oscillations-Mobs:  G-I, II, III, IV (PA's, Inf., Post.)  [x] (76018) Provided manual therapy to mobilize LE, proximal hip and/or LS spine soft tissue/joints for the purpose of modulating pain, promoting relaxation,  increasing ROM, reducing/eliminating soft tissue swelling/inflammation/restriction, improving soft tissue extensibility and allowing for proper ROM for normal function with self care, mobility, lifting and ambulation. Modalities: 15' game-ready to decrease post-op swelling and inflammation -(high compression)    Charges:  Timed Code Treatment Minutes: 45   Total Treatment Minutes: 60       [] EVAL (LOW) 06656 (typically 20 minutes face-to-face)  [] EVAL (MOD) 04246 (typically 30 minutes face-to-face)  [] EVAL (HIGH) 18395 (typically 45 minutes face-to-face)  [] RE-EVAL     [x] OT(62989) x   2  [] IONTO (06666)  [] NMR (82588) x     [x] VASO (18427)  [x] Manual (73236) x 1    [] Other:  [] TA (42031)x     [] Mech Traction (90454)  [] ES(attended) (72514)     [] ES (un) (10390):     GOALS:   Patient stated goal: \"To be able to run 2 miles\"  []? Progressing: []? Met: []? Not Met: []? Adjusted     Therapist goals for Patient:   Short Term Goals: To be achieved in: 2 weeks  1. Independent in HEP and progression per patient tolerance, in order to prevent re-injury. []? Progressing: []? Met: []? Not Met: []? Adjusted  2. Patient will have a decrease in pain to facilitate improvement in movement, function, and ADLs as indicated by Functional Deficits. []? Progressing: []? Met: []? Not Met: []? Adjusted     Long Term Goals: To be achieved in: 6 weeks  1. Disability index score of 25% or less for the LEFS to assist with reaching prior level of function. []? Progressing: []? Met: []? Not Met: []? Adjusted  2. Patient will demonstrate increased AROM to WNL for R knee to allow for proper joint functioning as indicated by patients Functional Deficits. []? Progressing: []? Met: []? Not Met: []? Adjusted  3. Patient will demonstrate an increase in Strength to good proximal hip strength and control, within 5lb HHD in LE to allow for proper functional mobility as indicated by patients Functional Deficits. []? Progressing: []? Met: []? Not Met: []? Adjusted  4. Patient will return to walk for 1 hour without increased symptoms or restriction. []? Progressing: []? Met: []? Not Met: []? Adjusted  5. Patient will be able to squat to pick something up off of the floor without increased symptoms or restriction. []? Progressing: []? Met: []? Not Met: []? Adjusted         Progression Towards Functional goals:  [] Patient is progressing as expected towards functional goals listed. [] Progression is slowed due to complexities listed. [] Progression has been slowed due to co-morbidities. [x] Plan just implemented, too soon to assess goals progression  [] Other:     ASSESSMENT:  Decreased TTP noted along distal ITB today. Focused on eccentric quad strengthening and stability exercises today, with fatigue and difficulty noted but no increased knee joint pain.  Decreased visible swelling and edema noted compared to last session, however pt continues to demonstrate increased swelling compared to L

## 2020-08-10 NOTE — PROGRESS NOTES
8/10/20  HISTORY OF PRESENT ILLNESS:   S/P Knee Arthroscopy  The Patient returns today for their postoperative visit after 7/7/2020 right knee arthroscopy. Pain control has been satisfactory with oral medications. There have been no fevers or chills. PHYSICAL EXAMINATION: Right knee  Inspection reveals expected swelling. Portal sites are clean and dry. The skin is warm. Range of motion is not limited by pain and swelling. There is no calf pain  Homans sign is negative. Examination of the contralateral knee reveals warm skin, range of motion within normal limits, good quadriceps bulk, tone and strength, no tenderness to palpation, stable cruciate and collateral ligaments, and no joint line tenderness. Examination of the Patients Lumbar spine reveals the skin is warm and dry. There is no swelling, warmth, or erythema. Range of motion is within normal limits. There is no paraspinal or spinous process tenderness. Ipsilateral and contralateral straight leg raising tests are negative. The distal neurovascular exam is grossly intact and symmetric. ASSESSMENT/PLAN:   The patient is doing well after knee arthroscopy. I have recommended ice, judicious use of NSAIDs, and physical therapy to diminish swelling and restore both motion and strength. I cautioned against overusing the knee, and they will schedule a reevaluation in 4 to 6 weeks  They verbalized good understanding of the plan. Jose Angel Guy D.O. [address] [place] [address] and Sports Medicine  Sports [facility] Trained  Board Certified  [employer] Team Physician      Disclaimer: \"This note was dictated with voice recognition software. Though review and correction are routine, we apologize for any errors. \"

## 2020-08-13 ENCOUNTER — HOSPITAL ENCOUNTER (OUTPATIENT)
Dept: PHYSICAL THERAPY | Age: 60
Setting detail: THERAPIES SERIES
Discharge: HOME | End: 2020-08-13
Payer: COMMERCIAL

## 2020-08-13 PROCEDURE — 97140 MANUAL THERAPY 1/> REGIONS: CPT

## 2020-08-13 PROCEDURE — 97016 VASOPNEUMATIC DEVICE THERAPY: CPT

## 2020-08-13 PROCEDURE — 97110 THERAPEUTIC EXERCISES: CPT

## 2020-08-13 NOTE — PROGRESS NOTES
(measured in lbs using hand held dynamometer) LEFT RIGHT   HIP Flexors 33.1 33.2   HIP Abductors 33.5 33.2   Knee EXT (quad) 52.7 58.8   Knee Flex (HS) 38.3 44.4           Circumference  Infrapatellar  Mid-patella  Suprapatellar       36.5 cm  39 cm  39.5 cm    37 cm  39 cm  40 cm       RESTRICTIONS/PRECAUTIONS: S/p R knee arthroscopy, PMM, synovectomy, chondroplasty of patella, trochlea and medial femoral condyle 7/7/20    Exercises/Interventions:   Therapeutic Ex 27' Resistance Sets/sec Reps Notes   BIKE   5'      HS stretch EOB  30\" 3    Prone quad stretch  30\" 3 Manual    Foam roller - ITB    For HEP   Heel slides     Quad sets     SLR flex/abd  2 10 Monitor patellar swelling with flex   SAQ     SB bridges  2 10    S/L clamshell teal 2 10    HS curls ecc 30# 2 10    Leg press ecc  Leg press SL 65#  60# 2  2 10  10    Incline calf stretch  30\" 3    Step up and overs 4\" 2 10    Lateral bandwalking teal 1 3 laps Band around shins   Wall sits  20\" 3    SAMANTHA abd 60# 2 10 B   TRX squats  2 10           Manual Intervention 10'       Knee mobs/PROM 5'   Knee flexion mobs   Tib/Fem Mobs       Patella Mobs       Ankle mobs       STM/roller stick 5'   Distal ITB          NMR re-education 8'              Modified fwd bosu lunges  5\" 15x    Tandem stance    SLS airex 30\" 3x    Retro slider lunges  1 15    LSD 2\" 3 10                             Therapeutic Exercise and NMR EXR  [x] (35156) Provided verbal/tactile cueing for activities related to strengthening, flexibility, endurance, ROM for improvements in LE, proximal hip, and core control with self care, mobility, lifting, ambulation.  [] (66862) Provided verbal/tactile cueing for activities related to improving balance, coordination, kinesthetic sense, posture, motor skill, proprioception  to assist with LE, proximal hip, and core control in self care, mobility, lifting, ambulation and eccentric single leg control.      NMR and Therapeutic Activities:    [] (36391 or 32202) Provided verbal/tactile cueing for activities related to improving balance, coordination, kinesthetic sense, posture, motor skill, proprioception and motor activation to allow for proper function of core, proximal hip and LE with self care and ADLs  [] (12629) Gait Re-education- Provided training and instruction to the patient for proper LE, core and proximal hip recruitment and positioning and eccentric body weight control with ambulation re-education including up and down stairs     Home Exercise Program:    [x] (20059) Reviewed/Progressed HEP activities related to strengthening, flexibility, endurance, ROM of core, proximal hip and LE for functional self-care, mobility, lifting and ambulation/stair navigation   [] (16712)Reviewed/Progressed HEP activities related to improving balance, coordination, kinesthetic sense, posture, motor skill, proprioception of core, proximal hip and LE for self care, mobility, lifting, and ambulation/stair navigation      Manual Treatments:  PROM / STM / Oscillations-Mobs:  G-I, II, III, IV (PA's, Inf., Post.)  [x] (68475) Provided manual therapy to mobilize LE, proximal hip and/or LS spine soft tissue/joints for the purpose of modulating pain, promoting relaxation,  increasing ROM, reducing/eliminating soft tissue swelling/inflammation/restriction, improving soft tissue extensibility and allowing for proper ROM for normal function with self care, mobility, lifting and ambulation.      Modalities: 15' game-ready to decrease post-op swelling and inflammation -(high compression)    Charges:  Timed Code Treatment Minutes: 45   Total Treatment Minutes: 60       [] EVAL (LOW) 65609 (typically 20 minutes face-to-face)  [] EVAL (MOD) 33066 (typically 30 minutes face-to-face)  [] EVAL (HIGH) 34306 (typically 45 minutes face-to-face)  [] RE-EVAL     [x] XF(76255) x   2  [] IONTO (54699)  [] NMR (68851) x     [x] VASO (68067)  [x] Manual (91511) x 1    [] Other:  [] TA (34048)x     [] strength, showing progress towards LTG's. Pt continues to demonstrate weakness with eccentric quad strength and stability. Pt would continue to benefit from skilled PT services to address knee AROM and eccentric quadriceps deficits in order to return to PLOF, especially having decreased difficulty with walking down steps. Return to Play: (if applicable)   []  Stage 1: Intro to Strength   []  Stage 2: Return to Run and Strength   []  Stage 3: Return to Jump and Strength   []  Stage 4: Dynamic Strength and Agility   []  Stage 5: Sport Specific Training     []  Ready to Return to Play, Meets All Above Stages   []  Not Ready for Return to Sports   Comments:            Treatment/Activity Tolerance:  [x] Patient tolerated treatment well [] Patient limited by fatique  [x] Patient limited by swelling  [] Patient limited by other medical complications  [] Other:     Overall Progression Towards Functional goals/ Treatment Progress Update:  [x] Patient is progressing as expected towards functional goals listed. [] Progression is slowed due to complexities/Impairments listed. [] Progression has been slowed due to co-morbidities. [] Plan just implemented, too soon to assess goals progression <30days   [] Goals require adjustment due to lack of progress  [] Patient is not progressing as expected and requires additional follow up with physician  [] Other    Prognosis for POC: [x] Good [] Fair  [] Poor    Patient requires continued skilled intervention: [x] Yes  [] No        PLAN:  Pt to progress back into HEP as able and to monitor amount of swelling in knee. Pt will be OOT 8/17 - 8/21.    [x] Continue per plan of care [] Alter current plan (see comments)  [] Plan of care initiated [x] Hold pending insurance auth [] Discharge    Electronically signed by: Hossein Maguire PT   Note: If patient does not return for scheduled/recommended follow up visits, this note will serve as a discharge from care along with the most

## 2020-08-26 ENCOUNTER — HOSPITAL ENCOUNTER (OUTPATIENT)
Dept: PHYSICAL THERAPY | Age: 60
Setting detail: THERAPIES SERIES
Discharge: HOME | End: 2020-08-26
Payer: COMMERCIAL

## 2020-08-26 PROCEDURE — 97110 THERAPEUTIC EXERCISES: CPT

## 2020-08-26 PROCEDURE — 97112 NEUROMUSCULAR REEDUCATION: CPT

## 2020-08-26 NOTE — FLOWSHEET NOTE
presentation.  Fair quad contraction but increased stiffness present with attempting knee flexion   8/13/20:  ROM LEFT RIGHT   Knee ext 0 0   Knee Flex 140 135   Strength (measured in lbs using hand held dynamometer) LEFT RIGHT   HIP Flexors 33.1 33.2   HIP Abductors 33.5 33.2   Knee EXT (quad) 52.7 58.8   Knee Flex (HS) 38.3 44.4           Circumference  Infrapatellar  Mid-patella  Suprapatellar       36.5 cm  39 cm  39.5 cm    37 cm  39 cm  40 cm       RESTRICTIONS/PRECAUTIONS: S/p R knee arthroscopy, PMM, synovectomy, chondroplasty of patella, trochlea and medial femoral condyle 7/7/20    Exercises/Interventions:   Therapeutic Ex 30' Resistance Sets/sec Reps Notes   BIKE   5'      HS stretch EOB  30\" 3    Prone quad stretch  30\" 3    Foam roller - ITB    For HEP   Heel slides     Quad sets     SLR flex/abd  2 10 Monitor patellar swelling with flex   SAQ     SB bridges  2 10    S/L clamshell teal 2 10    HS curls ecc 30# 2 10    Leg press ecc  Leg press SL 70#  60# 2  2 10  10    Incline calf stretch  30\" 3    Step up and overs    Lateral bandwalking teal 1 3 laps Band around shins   Wall sits  20\" 3    SAMANTHA abd 60# 2 10 B   TRX squats  2 10           Manual Intervention        Knee mobs/PROM   Knee flexion mobs   Tib/Fem Mobs       Patella Mobs       Ankle mobs       STM/roller stick   Distal ITB          NMR re-education 15'              Modified fwd bosu lunges  5\" 15x    Tandem stance    SLS airex 30\" 3x    Retro slider lunges  1 15    LSD 4\" 3 10    Retro step downs 4\" 2 10                      Therapeutic Exercise and NMR EXR  [x] (19278) Provided verbal/tactile cueing for activities related to strengthening, flexibility, endurance, ROM for improvements in LE, proximal hip, and core control with self care, mobility, lifting, ambulation.  [] (30088) Provided verbal/tactile cueing for activities related to improving balance, coordination, kinesthetic sense, posture, motor skill, proprioception  to assist with LE, proximal hip, and core control in self care, mobility, lifting, ambulation and eccentric single leg control. NMR and Therapeutic Activities:    [] (80243 or 83577) Provided verbal/tactile cueing for activities related to improving balance, coordination, kinesthetic sense, posture, motor skill, proprioception and motor activation to allow for proper function of core, proximal hip and LE with self care and ADLs  [] (05122) Gait Re-education- Provided training and instruction to the patient for proper LE, core and proximal hip recruitment and positioning and eccentric body weight control with ambulation re-education including up and down stairs     Home Exercise Program:    [x] (60563) Reviewed/Progressed HEP activities related to strengthening, flexibility, endurance, ROM of core, proximal hip and LE for functional self-care, mobility, lifting and ambulation/stair navigation   [] (28760)Reviewed/Progressed HEP activities related to improving balance, coordination, kinesthetic sense, posture, motor skill, proprioception of core, proximal hip and LE for self care, mobility, lifting, and ambulation/stair navigation      Manual Treatments:  PROM / STM / Oscillations-Mobs:  G-I, II, III, IV (PA's, Inf., Post.)  [x] (22060) Provided manual therapy to mobilize LE, proximal hip and/or LS spine soft tissue/joints for the purpose of modulating pain, promoting relaxation,  increasing ROM, reducing/eliminating soft tissue swelling/inflammation/restriction, improving soft tissue extensibility and allowing for proper ROM for normal function with self care, mobility, lifting and ambulation.      Modalities: 15' game-ready to decrease post-op swelling and inflammation -(high compression)    Charges:  Timed Code Treatment Minutes: 45   Total Treatment Minutes: 60       [] EVAL (LOW) 62739 (typically 20 minutes face-to-face)  [] EVAL (MOD) 70873 (typically 30 minutes face-to-face)  [] EVAL (HIGH) 70171 (typically 45 minutes face-to-face)  [] RE-EVAL     [x] OQ(33196) x   2  [] IONTO (82569)  [x] NMR (31838) x  1   [] VASO (21856)  [] Manual (50622) x     [] Other:  [] TA (26773)x     [] Mech Traction (62807)  [] ES(attended) (09024)     [] ES (un) (04738):     GOALS:   Patient stated goal: \"To be able to run 2 miles\"  []? Progressing: []? Met: [x]? Not Met: []? Adjusted     Therapist goals for Patient:   Short Term Goals: To be achieved in: 2 weeks  1. Independent in HEP and progression per patient tolerance, in order to prevent re-injury. []? Progressing: [x]? Met: []? Not Met: []? Adjusted  2. Patient will have a decrease in pain to facilitate improvement in movement, function, and ADLs as indicated by Functional Deficits. []? Progressing: [x]? Met: []? Not Met: []? Adjusted     Long Term Goals: To be achieved in: 6 weeks  1. Disability index score of 25% or less for the LEFS to assist with reaching prior level of function. [x]? Progressing: []? Met: []? Not Met: []? Adjusted  2. Patient will demonstrate increased AROM to WNL for R knee to allow for proper joint functioning as indicated by patients Functional Deficits. []? Progressing: [x]? Met: []? Not Met: []? Adjusted  3. Patient will demonstrate an increase in Strength to good proximal hip strength and control, within 5lb HHD in LE to allow for proper functional mobility as indicated by patients Functional Deficits. [x]? Progressing: []? Met: []? Not Met: []? Adjusted  4. Patient will return to walk for 1 hour without increased symptoms or restriction. [x]? Progressing: []? Met: []? Not Met: []? Adjusted  5. Patient will be able to squat to pick something up off of the floor without increased symptoms or restriction. [x]? Progressing: []? Met: []? Not Met: []? Adjusted         Progression Towards Functional goals:  [x] Patient is progressing as expected towards functional goals listed. [] Progression is slowed due to complexities listed.   [] Progression has been slowed due to co-morbidities. [] Plan just implemented, too soon to assess goals progression  [] Other:     ASSESSMENT: Pt demonstrates weakness with eccentric quad strength and control. Pt requires cues for appropriate hip/knee alignment with LSD's and retro step downs. Pt was fatigued with progressions today, but no increased knee pain noted. Pt demonstrates knee AROM WNL with no discomfort noted. Return to Play: (if applicable)   []  Stage 1: Intro to Strength   []  Stage 2: Return to Run and Strength   []  Stage 3: Return to Jump and Strength   []  Stage 4: Dynamic Strength and Agility   []  Stage 5: Sport Specific Training     []  Ready to Return to Play, Meets All Above Stages   []  Not Ready for Return to Sports   Comments:            Treatment/Activity Tolerance:  [x] Patient tolerated treatment well [] Patient limited by fatique  [x] Patient limited by swelling  [] Patient limited by other medical complications  [] Other:     Overall Progression Towards Functional goals/ Treatment Progress Update:  [x] Patient is progressing as expected towards functional goals listed. [] Progression is slowed due to complexities/Impairments listed. [] Progression has been slowed due to co-morbidities. [] Plan just implemented, too soon to assess goals progression <30days   [] Goals require adjustment due to lack of progress  [] Patient is not progressing as expected and requires additional follow up with physician  [] Other    Prognosis for POC: [x] Good [] Fair  [] Poor    Patient requires continued skilled intervention: [x] Yes  [] No        PLAN:  Pt to progress back into HEP as able and to monitor amount of swelling in knee.    [x] Continue per plan of care [] Alter current plan (see comments)  [] Plan of care initiated [x] Hold pending insurance auth [] Discharge    Electronically signed by: Satish Schilling, PT   Note: If patient does not return for scheduled/recommended follow up visits, this note will

## 2020-09-02 ENCOUNTER — HOSPITAL ENCOUNTER (OUTPATIENT)
Dept: PHYSICAL THERAPY | Age: 60
Setting detail: THERAPIES SERIES
Discharge: HOME | End: 2020-09-02
Payer: COMMERCIAL

## 2020-09-02 PROCEDURE — 97110 THERAPEUTIC EXERCISES: CPT

## 2020-09-02 PROCEDURE — 97140 MANUAL THERAPY 1/> REGIONS: CPT

## 2020-09-02 PROCEDURE — 97112 NEUROMUSCULAR REEDUCATION: CPT

## 2020-09-02 NOTE — FLOWSHEET NOTE
[facility]    Physical Therapy Treatment Note/ Progress Report:     Date:  2020    Patient Name:  Deny Mckenzie  \"Hayder\"  :  1960  MRN: [id number]  Medical/Treatment Diagnosis Information:  · Diagnosis: Complex tear of medial meniscus of right knee (S83.231D), acute pain of right knee (M25.561); s/p R knee arthroscop, PMM, synovectomy, chondroplasty of patella, trochlea, medial femoral condyle 20  · Treatment Diagnosis: Acute right knee pain  Insurance/Certification information:  PT Insurance Information: [employer] - $1182 OOP max, $70 co-pay (goes towards OOP), 40 PT visits per calendar year  Physician Information:  Referring Practitioner: Dr. Nhan Guerrero of care signed (Y/N):     Date of Patient follow up with Physician: 8/10/20     Progress Report: [x]  Yes  []  No     Functional Scale: LEFS = 37%   Date: 7/15/20    Date Range for reporting period:  Beginnin/15/20  Ending:      Progress report due (10 Rx/or 30 days whichever is less):      Recertification due (POC duration/ or 90 days whichever is less): 20     Visit # Insurance Allowable Auth Needed   2/6 6 visits approved 20 - 20 [x]Yes    []No     Pain level:  2/10     SUBJECTIVE:  Pt reports that he has been doing some yardwork over the last 3 days that required changing some sprinklers. Pt was kneeling on L leg with R leg out in front of him, but did quite a bit of getting up and down from the ground from this position. Pt states that he has had more soreness and a little pain over the kneecap the last few days, which he thinks is a result of this. Pt has follow up with Malena 9/10. OBJECTIVE:    Observation:    Test measurements:     2020: moderate lateral/superior patellar swelling and edema   20 RT knee moderate amount of edema and ballotable patella, fluid on the knee presentation.  Fair quad contraction but increased hip, and core control in self care, mobility, lifting, ambulation and eccentric single leg control. NMR and Therapeutic Activities:    [] (98147 or 88898) Provided verbal/tactile cueing for activities related to improving balance, coordination, kinesthetic sense, posture, motor skill, proprioception and motor activation to allow for proper function of core, proximal hip and LE with self care and ADLs  [] (57467) Gait Re-education- Provided training and instruction to the patient for proper LE, core and proximal hip recruitment and positioning and eccentric body weight control with ambulation re-education including up and down stairs     Home Exercise Program:    [x] (09954) Reviewed/Progressed HEP activities related to strengthening, flexibility, endurance, ROM of core, proximal hip and LE for functional self-care, mobility, lifting and ambulation/stair navigation   [] (24559)Reviewed/Progressed HEP activities related to improving balance, coordination, kinesthetic sense, posture, motor skill, proprioception of core, proximal hip and LE for self care, mobility, lifting, and ambulation/stair navigation      Manual Treatments:  PROM / STM / Oscillations-Mobs:  G-I, II, III, IV (PA's, Inf., Post.)  [x] (97040) Provided manual therapy to mobilize LE, proximal hip and/or LS spine soft tissue/joints for the purpose of modulating pain, promoting relaxation,  increasing ROM, reducing/eliminating soft tissue swelling/inflammation/restriction, improving soft tissue extensibility and allowing for proper ROM for normal function with self care, mobility, lifting and ambulation.      Modalities: 15' game-ready to decrease post-op swelling and inflammation -(high compression)    Charges:  Timed Code Treatment Minutes: 45   Total Treatment Minutes: 60       [] EVAL (LOW) 30625 (typically 20 minutes face-to-face)  [] EVAL (MOD) 84930 (typically 30 minutes face-to-face)  [] EVAL (HIGH) 41124 (typically 45 minutes face-to-face)  [] RE-EVAL     [x] AY(49854) x   1  [] IONTO (33453)  [x] NMR (70469) x  1   [] VASO (46751)  [x] Manual (57821) x 1   [] Other:  [] TA (01656)x     [] Mech Traction (69033)  [] ES(attended) (92864)     [] ES (un) (65686):     GOALS:   Patient stated goal: \"To be able to run 2 miles\"  []? Progressing: []? Met: [x]? Not Met: []? Adjusted     Therapist goals for Patient:   Short Term Goals: To be achieved in: 2 weeks  1. Independent in HEP and progression per patient tolerance, in order to prevent re-injury. []? Progressing: [x]? Met: []? Not Met: []? Adjusted  2. Patient will have a decrease in pain to facilitate improvement in movement, function, and ADLs as indicated by Functional Deficits. []? Progressing: [x]? Met: []? Not Met: []? Adjusted     Long Term Goals: To be achieved in: 6 weeks  1. Disability index score of 25% or less for the LEFS to assist with reaching prior level of function. [x]? Progressing: []? Met: []? Not Met: []? Adjusted  2. Patient will demonstrate increased AROM to WNL for R knee to allow for proper joint functioning as indicated by patients Functional Deficits. []? Progressing: [x]? Met: []? Not Met: []? Adjusted  3. Patient will demonstrate an increase in Strength to good proximal hip strength and control, within 5lb HHD in LE to allow for proper functional mobility as indicated by patients Functional Deficits. [x]? Progressing: []? Met: []? Not Met: []? Adjusted  4. Patient will return to walk for 1 hour without increased symptoms or restriction. [x]? Progressing: []? Met: []? Not Met: []? Adjusted  5. Patient will be able to squat to pick something up off of the floor without increased symptoms or restriction. [x]? Progressing: []? Met: []? Not Met: []? Adjusted         Progression Towards Functional goals:  [x] Patient is progressing as expected towards functional goals listed. [] Progression is slowed due to complexities listed.   [] Progression has been slowed due to

## 2020-09-10 ENCOUNTER — HOSPITAL ENCOUNTER (OUTPATIENT)
Dept: PHYSICAL THERAPY | Age: 60
Setting detail: THERAPIES SERIES
Discharge: HOME | End: 2020-09-10
Payer: COMMERCIAL

## 2020-09-10 PROCEDURE — 97140 MANUAL THERAPY 1/> REGIONS: CPT

## 2020-09-10 PROCEDURE — 97112 NEUROMUSCULAR REEDUCATION: CPT

## 2020-09-10 PROCEDURE — 97110 THERAPEUTIC EXERCISES: CPT

## 2020-09-10 NOTE — FLOWSHEET NOTE
[facility]    Physical Therapy Treatment Note/ Progress Report:     Date:  9/10/2020    Patient Name:  Damon Kwan  \"Hayder\"  :  1960  MRN: [id number]  Medical/Treatment Diagnosis Information:  · Diagnosis: Complex tear of medial meniscus of right knee (S83.231D), acute pain of right knee (M25.561); s/p R knee arthroscop, PMM, synovectomy, chondroplasty of patella, trochlea, medial femoral condyle 20  · Treatment Diagnosis: Acute right knee pain  Insurance/Certification information:  PT Insurance Information: [employer] - $3277 OOP max, $70 co-pay (goes towards OOP), 40 PT visits per calendar year  Physician Information:  Referring Practitioner: Dr. Tab Pete of care signed (Y/N):     Date of Patient follow up with Physician: 8/10/20     Progress Report: [x]  Yes  []  No     Functional Scale: LEFS = 37%   Date: 7/15/20    Date Range for reporting period:  Beginnin/15/20  Ending:      Progress report due (10 Rx/or 30 days whichever is less):      Recertification due (POC duration/ or 90 days whichever is less): 20     Visit # Insurance Allowable Auth Needed   3/6 6 visits approved 20 - 20 [x]Yes    []No     Pain level:  2/10     SUBJECTIVE:  Pt reports that he still has some anteroinferior patellar pain, but admits to increasing activity and the amount he has been walking at work. Pt states that he is not sure if he is doing some of the exercises correctly because he has some patellar pain occasionally with some of them. Pt has follow up with Malena . OBJECTIVE:    Observation:    Test measurements:     2020: moderate lateral/superior patellar swelling and edema   20 RT knee moderate amount of edema and ballotable patella, fluid on the knee presentation.  Fair quad contraction but increased stiffness present with attempting knee flexion   20:  ROM LEFT RIGHT   Knee ext 0 0 Knee Flex 140 135   Strength (measured in lbs using hand held dynamometer) LEFT RIGHT   HIP Flexors 33.1 33.2   HIP Abductors 33.5 33.2   Knee EXT (quad) 52.7 58.8   Knee Flex (HS) 38.3 44.4           Circumference  Infrapatellar  Mid-patella  Suprapatellar       36.5 cm  39 cm  39.5 cm    37 cm  39 cm  40 cm       RESTRICTIONS/PRECAUTIONS: S/p R knee arthroscopy, PMM, synovectomy, chondroplasty of patella, trochlea and medial femoral condyle 7/7/20    Exercises/Interventions:   Therapeutic Ex 25' Resistance Sets/sec Reps Notes   BIKE   5'      HS stretch EOB  30\" 3    Prone quad stretch  30\" 3    Foam roller - ITB    For HEP   Heel slides     Quad sets     SLR flex/abd  2 10 Monitor patellar swelling with flex   SAQ     SB bridges  2 10    S/L clamshell teal 2 10    HS curls ecc 30# 2 10    Leg press ecc  Leg press SL 70#  60# 2  2 10  10    Incline calf stretch  30\" 3    Step up and overs    Lateral bandwalking maroon 1 3 laps Band around shins   Knee extension iso's \"E\" 10\" 10    SAMANTHA abd 60# 2 10 B   TRX squats  2 10           Manual Intervention 10'       Knee mobs/PROM    + LE stretching   Tib/Fem Mobs       Patella Mobs       IASTM 5'   Patellar tendon   STM/roller stick 5'   Distal ITB          NMR re-education 10'              Modified fwd bosu lunges  5\" 15x Alternating B LE   Tandem stance    SLS airex 30\" 3x    Retro slider lunges  1 15    LSD    Retro step downs    Glut step ups 4\" 1 10               Therapeutic Exercise and NMR EXR  [x] (70976) Provided verbal/tactile cueing for activities related to strengthening, flexibility, endurance, ROM for improvements in LE, proximal hip, and core control with self care, mobility, lifting, ambulation.  [] (56772) Provided verbal/tactile cueing for activities related to improving balance, coordination, kinesthetic sense, posture, motor skill, proprioception  to assist with LE, proximal hip, and core control in self care, mobility, lifting, ambulation and eccentric single leg control. NMR and Therapeutic Activities:    [] (68411 or 00939) Provided verbal/tactile cueing for activities related to improving balance, coordination, kinesthetic sense, posture, motor skill, proprioception and motor activation to allow for proper function of core, proximal hip and LE with self care and ADLs  [] (51209) Gait Re-education- Provided training and instruction to the patient for proper LE, core and proximal hip recruitment and positioning and eccentric body weight control with ambulation re-education including up and down stairs     Home Exercise Program:    [x] (46408) Reviewed/Progressed HEP activities related to strengthening, flexibility, endurance, ROM of core, proximal hip and LE for functional self-care, mobility, lifting and ambulation/stair navigation   [] (53681)Reviewed/Progressed HEP activities related to improving balance, coordination, kinesthetic sense, posture, motor skill, proprioception of core, proximal hip and LE for self care, mobility, lifting, and ambulation/stair navigation      Manual Treatments:  PROM / STM / Oscillations-Mobs:  G-I, II, III, IV (PA's, Inf., Post.)  [x] (69977) Provided manual therapy to mobilize LE, proximal hip and/or LS spine soft tissue/joints for the purpose of modulating pain, promoting relaxation,  increasing ROM, reducing/eliminating soft tissue swelling/inflammation/restriction, improving soft tissue extensibility and allowing for proper ROM for normal function with self care, mobility, lifting and ambulation.      Modalities: 15' game-ready to decrease post-op swelling and inflammation -(high compression)    Charges:  Timed Code Treatment Minutes: 45   Total Treatment Minutes: 60       [] EVAL (LOW) 38680 (typically 20 minutes face-to-face)  [] EVAL (MOD) 48800 (typically 30 minutes face-to-face)  [] EVAL (HIGH) 08895 (typically 45 minutes face-to-face)  [] RE-EVAL     [x] UD(95458) x   1  [] IONTO (72386)  [x] NMR (89378) x progression  [] Other:     ASSESSMENT:  Pt demonstrates increased TTP and tightness noted along distal ITB today. Pt requires frequent verbal and tactile cues to perform exercises correctly and with appropriate knee/hip alignment, especially with bosu lunges and glut step ups. Pt continues to require further eccentric quad strength and control in order to decrease patellar tendon pain. Return to Play: (if applicable)   []  Stage 1: Intro to Strength   []  Stage 2: Return to Run and Strength   []  Stage 3: Return to Jump and Strength   []  Stage 4: Dynamic Strength and Agility   []  Stage 5: Sport Specific Training     []  Ready to Return to Play, Meets All Above Stages   []  Not Ready for Return to Sports   Comments:            Treatment/Activity Tolerance:  [x] Patient tolerated treatment well [] Patient limited by fatique  [] Patient limited by swelling  [] Patient limited by other medical complications  [] Other:     Overall Progression Towards Functional goals/ Treatment Progress Update:  [x] Patient is progressing as expected towards functional goals listed. [] Progression is slowed due to complexities/Impairments listed. [] Progression has been slowed due to co-morbidities. [] Plan just implemented, too soon to assess goals progression <30days   [] Goals require adjustment due to lack of progress  [] Patient is not progressing as expected and requires additional follow up with physician  [] Other    Prognosis for POC: [x] Good [] Fair  [] Poor    Patient requires continued skilled intervention: [x] Yes  [] No        PLAN:  Pt to progress back into HEP as able and to monitor amount of swelling in knee.    [x] Continue per plan of care [] Alter current plan (see comments)  [] Plan of care initiated [x] Hold pending insurance auth [] Discharge    Electronically signed by: Chago Bhatt PT   Note: If patient does not return for scheduled/recommended follow up visits, this note will serve as a discharge from care along with the most recent update on progress.

## 2020-09-16 ENCOUNTER — HOSPITAL ENCOUNTER (OUTPATIENT)
Dept: PHYSICAL THERAPY | Age: 60
Setting detail: THERAPIES SERIES
Discharge: HOME | End: 2020-09-16
Payer: COMMERCIAL

## 2020-09-16 PROCEDURE — 97110 THERAPEUTIC EXERCISES: CPT | Performed by: SPECIALIST/TECHNOLOGIST

## 2020-09-16 PROCEDURE — 97530 THERAPEUTIC ACTIVITIES: CPT | Performed by: SPECIALIST/TECHNOLOGIST

## 2020-09-16 PROCEDURE — 97140 MANUAL THERAPY 1/> REGIONS: CPT | Performed by: SPECIALIST/TECHNOLOGIST

## 2020-09-16 PROCEDURE — 97112 NEUROMUSCULAR REEDUCATION: CPT | Performed by: SPECIALIST/TECHNOLOGIST

## 2020-09-16 NOTE — FLOWSHEET NOTE
[facility]    Physical Therapy Treatment Note/ Progress Report:     Date:  2020    Patient Name:  Lydia Pritchard  \"Hayder\"  :  1960  MRN: [id number]  Medical/Treatment Diagnosis Information:  · Diagnosis: Complex tear of medial meniscus of right knee (S83.231D), acute pain of right knee (M25.561); s/p R knee arthroscop, PMM, synovectomy, chondroplasty of patella, trochlea, medial femoral condyle 20  · Treatment Diagnosis: Acute right knee pain  Insurance/Certification information:  PT Insurance Information: [employer] - $3344 OOP max, $70 co-pay (goes towards OOP), 40 PT visits per calendar year  Physician Information:  Referring Practitioner: Dr. Mark Bowman of care signed (Y/N):     Date of Patient follow up with Physician: 8/10/20     Progress Report: [x]  Yes  []  No     Functional Scale: LEFS = 37%   Date: 7/15/20    Date Range for reporting period:  Beginnin/15/20  Ending:      Progress report due (10 Rx/or 30 days whichever is less):      Recertification due (POC duration/ or 90 days whichever is less): 20     Visit # Insurance Allowable Auth Needed   /6 6 visits approved 20 - 20 [x]Yes    []No     Pain level:  2/10     SUBJECTIVE: Pt has had some patellar pain that was lessened by not doing much for 48 hours and it improved. Walks about 2500 steps at night but it is slight upgrade and mainly down a slope. 50% flat 25% up 25% down Walking slope and can change his path to see i   Pt has follow up with Malena . OBJECTIVE:    Observation:    Test measurements:     2020: moderate lateral/superior patellar swelling and edema   20 RT knee moderate amount of edema and ballotable patella, fluid on the knee presentation.  Fair quad contraction but increased stiffness present with attempting knee flexion   20:  ROM LEFT RIGHT   Knee ext 0 0   Knee Flex 140 135   Strength (measured in lbs using hand held dynamometer) LEFT RIGHT   HIP Flexors 33.1 33.2   HIP Abductors 33.5 33.2   Knee EXT (quad) 52.7 58.8   Knee Flex (HS) 38.3 44.4           Circumference  Infrapatellar  Mid-patella  Suprapatellar       36.5 cm  39 cm  39.5 cm    37 cm  39 cm  40 cm    9/    RESTRICTIONS/PRECAUTIONS: S/p R knee arthroscopy, PMM, synovectomy, chondroplasty of patella, trochlea and medial femoral condyle 7/7/20    Exercises/Interventions:   Therapeutic Ex 25' Resistance Sets/sec Reps Notes   BIKE   5'      HS stretch EOB  30\" 3    standing quad stretch  30\" 3    Foam roller - ITB    For HEP   Heel slides     Quad sets     SLR flex/abd  2 10 Less TKE due to ^ pain   SAQ     SB bridges  2 10    S/L clamshell wine 3 10    HS curls Ecc 30# 2 10    Leg press Ecc  Leg press SL 70#   2   10     HOLD   Incline calf stretch  30\" 3x    Step up and overs    Lateral bandwalking maroon 1 3 laps Band around shins ^ patellar pain with squat positions   Knee extension iso's    SAMANTHA Abd 60# 2 10 B   TRX squats  2 10 HOLD          Manual Intervention 10'       Knee mobs/PROM    + LE stretching   Tib/Fem Mobs       Patella Mobs       IASTM 5'   Patellar tendon   STM/roller stick   Distal ITB          NMR re-education 10'              Modified fwd bosu lunges  5\" 10x Alternating B LE   Tandem stance airmat 30\" 3x    SLS HOLD   Retro slider lunges  2 10x    LSD HOLD   Retro step downs HOLD   Glut step ups Hold              Therapeutic Exercise and NMR EXR  [x] (88030) Provided verbal/tactile cueing for activities related to strengthening, flexibility, endurance, ROM for improvements in LE, proximal hip, and core control with self care, mobility, lifting, ambulation.  [] (73685) Provided verbal/tactile cueing for activities related to improving balance, coordination, kinesthetic sense, posture, motor skill, proprioception  to assist with LE, proximal hip, and core control in self care, mobility, lifting, ambulation and eccentric single leg control. NMR and Therapeutic Activities:  Pt educated about how to protect knee/ patellar tendon with ADL's and decrease amount of walking steps w/ elevation. HEP ok  Tandem balance but no  SLS focus due to increased stress  to tendon due to eccentric quad weakness or poor endurance. [x] (77160 or 16149) Provided verbal/tactile cueing for activities related to improving balance, coordination, kinesthetic sense, posture, motor skill, proprioception and motor activation to allow for proper function of core, proximal hip and LE with self care and ADLs  [] (85047) Gait Re-education- Provided training and instruction to the patient for proper LE, core and proximal hip recruitment and positioning and eccentric body weight control with ambulation re-education including up and down stairs     Home Exercise Program:    [x] (38543) Reviewed/Progressed HEP activities related to strengthening, flexibility, endurance, ROM of core, proximal hip and LE for functional self-care, mobility, lifting and ambulation/stair navigation   [] (57823)Reviewed/Progressed HEP activities related to improving balance, coordination, kinesthetic sense, posture, motor skill, proprioception of core, proximal hip and LE for self care, mobility, lifting, and ambulation/stair navigation      Manual Treatments:  PROM / STM / Oscillations-Mobs:  G-I, II, III, IV (PA's, Inf., Post.)  [x] (17847) Provided manual therapy to mobilize LE, proximal hip and/or LS spine soft tissue/joints for the purpose of modulating pain, promoting relaxation,  increasing ROM, reducing/eliminating soft tissue swelling/inflammation/restriction, improving soft tissue extensibility and allowing for proper ROM for normal function with self care, mobility, lifting and ambulation.      Modalities:8' ice cup massageCharges:  Timed Code Treatment Minutes: 55   Total Treatment Minutes: 63       [] EVAL (LOW) 86156 (typically 20 minutes face-to-face)  [] EVAL (MOD) 33810 (typically 30 minutes face-to-face)  [] EVAL (HIGH) 40064 (typically 45 minutes face-to-face)  [] RE-EVAL     [x] OV(06561) x   1  [] IONTO (71047)  [x] NMR (78785) x  1   [] VASO (35137)  [x] Manual (70567) x 1   [] Other:  [x] TA (81930)x  1   [] Mech Traction (56522)  [] ES(attended) (03176)     [] ES (un) (64076):     GOALS:   Patient stated goal: \"To be able to run 2 miles\"  []? Progressing: []? Met: [x]? Not Met: []? Adjusted     Therapist goals for Patient:   Short Term Goals: To be achieved in: 2 weeks  1. Independent in HEP and progression per patient tolerance, in order to prevent re-injury. []? Progressing: [x]? Met: []? Not Met: []? Adjusted  2. Patient will have a decrease in pain to facilitate improvement in movement, function, and ADLs as indicated by Functional Deficits. []? Progressing: [x]? Met: []? Not Met: []? Adjusted     Long Term Goals: To be achieved in: 6 weeks  1. Disability index score of 25% or less for the LEFS to assist with reaching prior level of function. [x]? Progressing: []? Met: []? Not Met: []? Adjusted  2. Patient will demonstrate increased AROM to WNL for R knee to allow for proper joint functioning as indicated by patients Functional Deficits. []? Progressing: [x]? Met: []? Not Met: []? Adjusted  3. Patient will demonstrate an increase in Strength to good proximal hip strength and control, within 5lb HHD in LE to allow for proper functional mobility as indicated by patients Functional Deficits. [x]? Progressing: []? Met: []? Not Met: []? Adjusted  4. Patient will return to walk for 1 hour without increased symptoms or restriction. [x]? Progressing: []? Met: []? Not Met: []? Adjusted  5. Patient will be able to squat to pick something up off of the floor without increased symptoms or restriction. [x]? Progressing: []? Met: []? Not Met: []?  Adjusted         Progression Towards Functional goals:  [x] Patient is progressing as expected towards functional goals listed. [] Progression is slowed due to complexities listed. [] Progression has been slowed due to co-morbidities. [] Plan just implemented, too soon to assess goals progression  [] Other:     ASSESSMENT:  Pt demonstrates mild increase in TTP over Rt patellar tendon. Program focused on educating patient about appropriate HEP and not overloading patellar tendon with prolonged hilly walking, SLS and wallsits that need to address appropriate mechanics. Pt moves rapidly thru program content and needs cues to protect his knee. Had some increased discomfort with SL LP, lateral shuffling in squat positions so exercises were held as a result. Pt requires frequent verbal and tactile cues to perform exercises correctly and with appropriate knee/hip alignment, especially with bosu lunges and supine SLR flexion. Less TKE decreased stress to patellar tendon. Pt continues to require further eccentric quad strength and control in order to decrease patellar tendon pain. Return to Play: (if applicable)   []  Stage 1: Intro to Strength   []  Stage 2: Return to Run and Strength   []  Stage 3: Return to Jump and Strength   []  Stage 4: Dynamic Strength and Agility   []  Stage 5: Sport Specific Training     []  Ready to Return to Play, Meets All Above Stages   []  Not Ready for Return to Sports   Comments:            Treatment/Activity Tolerance:  [x] Patient tolerated treatment well [x] Patient limited by fatique w/ wallsits, supine flex  [] Patient limited by swelling  [] Patient limited by other medical complications  [] Other:     Overall Progression Towards Functional goals/ Treatment Progress Update:  [x] Patient is progressing as expected towards functional goals listed. [] Progression is slowed due to complexities/Impairments listed. [] Progression has been slowed due to co-morbidities.   [] Plan just implemented, too soon to assess goals progression <30days   [] Goals require adjustment due to lack of progress  [] Patient is not progressing as expected and requires additional follow up with physician  [] Other    Prognosis for POC: [x] Good [] Fair  [] Poor    Patient requires continued skilled intervention: [x] Yes  [] No        PLAN:  Pt to progress back into HEP as able and to monitor amount of swelling in knee. Guy tomorrow. Increase ice cupping 2x/daily, DN? Or patellar tendonitis strap PRN. Must decrease stress to healing tendon. Ok to perform standing quad stretch daily  [x] Continue per plan of care [] Alter current plan (see comments)  [] Plan of care initiated [] Hold pending insurance auth [] Discharge    Electronically signed by: Liz Bermudez, PTA, [id number]  Note: If patient does not return for scheduled/recommended follow up visits, this note will serve as a discharge from care along with the most recent update on progress.

## 2020-09-17 ENCOUNTER — OFFICE VISIT (OUTPATIENT)
Dept: ORTHOPEDIC SURGERY | Age: 60
End: 2020-09-17
Payer: COMMERCIAL

## 2020-09-17 VITALS — TEMPERATURE: 98.1 F | HEIGHT: 70 IN | WEIGHT: 172 LBS | BODY MASS INDEX: 24.62 KG/M2

## 2020-09-17 PROCEDURE — 99024 POSTOP FOLLOW-UP VISIT: CPT | Performed by: ORTHOPAEDIC SURGERY

## 2020-09-17 PROCEDURE — 20610 DRAIN/INJ JOINT/BURSA W/O US: CPT | Performed by: ORTHOPAEDIC SURGERY

## 2020-09-17 RX ORDER — BUPIVACAINE HYDROCHLORIDE 5 MG/ML
30 INJECTION, SOLUTION PERINEURAL ONCE
Status: COMPLETED | OUTPATIENT
Start: 2020-09-17 | End: 2020-09-17

## 2020-09-17 RX ORDER — BETAMETHASONE SODIUM PHOSPHATE AND BETAMETHASONE ACETATE 3; 3 MG/ML; MG/ML
6 INJECTION, SUSPENSION INTRA-ARTICULAR; INTRALESIONAL; INTRAMUSCULAR; SOFT TISSUE ONCE
Status: COMPLETED | OUTPATIENT
Start: 2020-09-17 | End: 2020-09-17

## 2020-09-17 RX ADMIN — BETAMETHASONE SODIUM PHOSPHATE AND BETAMETHASONE ACETATE 6 MG: 3; 3 INJECTION, SUSPENSION INTRA-ARTICULAR; INTRALESIONAL; INTRAMUSCULAR; SOFT TISSUE at 10:21

## 2020-09-17 RX ADMIN — BUPIVACAINE HYDROCHLORIDE 150 MG: 5 INJECTION, SOLUTION PERINEURAL at 10:22

## 2020-09-17 NOTE — PROGRESS NOTES
sterile alcohol solution. A sterile 22-gauge needle was inserted into the right knee and a mixture of 3ml of 6mg/ml Celestone, 7mL of 0.5% Marcaine  was injected under sterile technique. The needle was withdrawn and the puncture site sealed with a Band-Aid. The patient tolerated the injection well. The patient was instructed to call the office immediately if there is any pain, redness, warmth, fever, or chills. Marisa Guy D.O. [address] [place] [address] and Sports Medicine  Sports Fellowship Trained  Board Certified  [employer] Team Physician      Disclaimer: \"This note was dictated with voice recognition software. Though review and correction are routine, we apologize for any errors. \"

## 2020-09-23 ENCOUNTER — HOSPITAL ENCOUNTER (OUTPATIENT)
Dept: PHYSICAL THERAPY | Age: 60
Setting detail: THERAPIES SERIES
Discharge: HOME | End: 2020-09-23
Payer: COMMERCIAL

## 2020-09-23 PROCEDURE — 97110 THERAPEUTIC EXERCISES: CPT | Performed by: SPECIALIST/TECHNOLOGIST

## 2020-09-23 PROCEDURE — 97530 THERAPEUTIC ACTIVITIES: CPT | Performed by: SPECIALIST/TECHNOLOGIST

## 2020-09-23 PROCEDURE — 97140 MANUAL THERAPY 1/> REGIONS: CPT | Performed by: SPECIALIST/TECHNOLOGIST

## 2020-09-23 NOTE — FLOWSHEET NOTE
[facility]    Physical Therapy Treatment Note/ Progress Report:     Date:  2020    Patient Name:  Sheldon Wharton  \"Hayder\"  :  1960  MRN: [id number]  Medical/Treatment Diagnosis Information:  · Diagnosis: Complex tear of medial meniscus of right knee (S83.231D), acute pain of right knee (M25.561); s/p R knee arthroscop, PMM, synovectomy, chondroplasty of patella, trochlea, medial femoral condyle 20  · Treatment Diagnosis: Acute right knee pain  Insurance/Certification information:  PT Insurance Information: [employer] - $9486 OOP max, $70 co-pay (goes towards OOP), 40 PT visits per calendar year  Physician Information:  Referring Practitioner: Dr. Elba Garcia of care signed (Y/N):     Date of Patient follow up with Physician: 8/10/20     Progress Report: [x]  Yes  []  No     Functional Scale: LEFS = 37%   Date: 7/15/20    Date Range for reporting period:  Beginnin/15/20  Ending:      Progress report due (10 Rx/or 30 days whichever is less):      Recertification due (POC duration/ or 90 days whichever is less): 20     Visit # Insurance Allowable Auth Needed   / 6 visits approved 20 - 20 [x]Yes    []No     Pain level:  2/10 Stiffness more than pain    SUBJECTIVE:  Taking meloxicam and cant tell if it helps. Injection last week seemed to help his knee a lot in terms of swelling etc but knee is feeling tight since but admits to sitting more and resting. Did a lot of stairs on  which put more stress on his knee. OBJECTIVE:    Observation:    Test measurements:     2020: moderate lateral/superior patellar swelling and edema   20 RT knee moderate amount of edema and ballotable patella, fluid on the knee presentation.  Fair quad contraction but increased stiffness present with attempting knee flexion   20:  ROM LEFT RIGHT   Knee ext 0 0   Knee Flex 140 135   Strength (measured in lbs using hand held dynamometer) LEFT RIGHT   HIP Flexors 33.1 33.2   HIP Abductors 33.5 33.2   Knee EXT (quad) 52.7 58.8   Knee Flex (HS) 38.3 44.4           Circumference  Infrapatellar  Mid-patella  Suprapatellar       36.5 cm  39 cm  39.5 cm    37 cm  39 cm  40 cm    9/23/20 Rt knee  Fair quad  Contraction, poor vmo. Presented with Rt knee -5/ 0 after IASTM and prone hang/ hamstring today. Infrapatellar fat pad tenderness down toward patellar tendon. TKE increases infrapatellar pain with active quad contraction. LEFS 70%    RESTRICTIONS/PRECAUTIONS: S/p R knee arthroscopy, PMM, synovectomy, chondroplasty of patella, trochlea and medial femoral condyle 7/7/20    Exercises/Interventions:   Therapeutic Ex 25' Resistance Sets/sec Reps Notes   BIKE   5'      HS stretch EOB  30\" 3    standing quad stretch  30\" 3    Foam roller - ITB    For HEP   Heel slides     Quad sets     SLR seated  Flex/abd3#   SLR+ seated  2  30\" 10  3x Monitor TKE   SAQ     SB bridges  2 10    S/L clamshell 3# 3 10    HS curls Ecc 30# 2 10    Leg press Ecc  Leg press SL 80#   2   10     HOLD   Incline calf stretch  30\" 3x    Step up and overs    Lateral bandwalking Knee extension iso's    SAMANTHA Abd 60# 2 10 B   TRX squats  2 10 Even distrubution of wt.            Manual Intervention 10'       Knee mobs/PROM    + LE stretching   Tib/Fem Mobs       Patella Mobs       IASTM 5'   Patellar tendon   STM/roller stick   Distal ITB          NMR re-education               Modified fwd bosu lunges  5\" 10x Alternating B LE   Tandem stance    SLS HOLD   Retro slider lunges    LSD HOLD   Retro step downs HOLD   Glut step ups Hold              Therapeutic Exercise and NMR EXR  [x] (58870) Provided verbal/tactile cueing for activities related to strengthening, flexibility, endurance, ROM for improvements in LE, proximal hip, and core control with self care, mobility, lifting, ambulation.  [] (92146) Provided verbal/tactile cueing for activities related to extensibility and allowing for proper ROM for normal function with self care, mobility, lifting and ambulation. Modalities:8' ice cup massageCharges:  Timed Code Treatment Minutes: 55   Total Treatment Minutes: 63       [] EVAL (LOW) 60873 (typically 20 minutes face-to-face)  [] EVAL (MOD) 42582 (typically 30 minutes face-to-face)  [] EVAL (HIGH) 47340 (typically 45 minutes face-to-face)  [] RE-EVAL     [x] DB(74196) x   2  [] IONTO (61673)  [] NMR (04529) x     [] VASO (12900)  [x] Manual (98893) x 1   [] Other:  [x] TA (00925)x  1   [] Mech Traction (15350)  [] ES(attended) (15611)     [] ES (un) (61784):     GOALS:   Patient stated goal: \"To be able to run 2 miles\"  []? Progressing: []? Met: [x]? Not Met: []? Adjusted     Therapist goals for Patient:   Short Term Goals: To be achieved in: 2 weeks  1. Independent in HEP and progression per patient tolerance, in order to prevent re-injury. []? Progressing: [x]? Met: []? Not Met: []? Adjusted  2. Patient will have a decrease in pain to facilitate improvement in movement, function, and ADLs as indicated by Functional Deficits. []? Progressing: [x]? Met: []? Not Met: []? Adjusted     Long Term Goals: To be achieved in: 6 weeks  1. Disability index score of 25% or less for the LEFS to assist with reaching prior level of function. [x]? Progressing: []? Met: []? Not Met: []? Adjusted  2. Patient will demonstrate increased AROM to WNL for R knee to allow for proper joint functioning as indicated by patients Functional Deficits. []? Progressing: [x]? Met: []? Not Met: []? Adjusted  3. Patient will demonstrate an increase in Strength to good proximal hip strength and control, within 5lb HHD in LE to allow for proper functional mobility as indicated by patients Functional Deficits. [x]? Progressing: []? Met: []? Not Met: []? Adjusted  4. Patient will return to walk for 1 hour without increased symptoms or restriction. [x]? Progressing: []? Met: []?  Not Met: complications  [] Other:     Overall Progression Towards Functional goals/ Treatment Progress Update:  [x] Patient is progressing as expected towards functional goals listed. [] Progression is slowed due to complexities/Impairments listed. [] Progression has been slowed due to co-morbidities. [] Plan just implemented, too soon to assess goals progression <30days   [] Goals require adjustment due to lack of progress  [] Patient is not progressing as expected and requires additional follow up with physician  [] Other    Prognosis for POC: [x] Good [] Fair  [] Poor    Patient requires continued skilled intervention: [x] Yes  [] No        PLAN:  Pt to progress back into HEP as able and to monitor amount of swelling in knee. Increase ice cupping 2x/daily, DN? Or patellar tendonitis strap PRN. Must decrease stress to healing tendon. Ok to perform standing quad stretch daily  [x] Continue per plan of care [] Alter current plan (see comments)  [] Plan of care initiated [] Hold pending insurance auth [] Discharge    Electronically signed by: Chandler Castaneda, PTA, [id number]  Note: If patient does not return for scheduled/recommended follow up visits, this note will serve as a discharge from care along with the most recent update on progress.

## 2020-09-30 ENCOUNTER — HOSPITAL ENCOUNTER (OUTPATIENT)
Dept: PHYSICAL THERAPY | Age: 60
Setting detail: THERAPIES SERIES
Discharge: HOME | End: 2020-09-30
Payer: COMMERCIAL

## 2020-09-30 PROCEDURE — 97140 MANUAL THERAPY 1/> REGIONS: CPT | Performed by: SPECIALIST/TECHNOLOGIST

## 2020-09-30 PROCEDURE — 97112 NEUROMUSCULAR REEDUCATION: CPT | Performed by: SPECIALIST/TECHNOLOGIST

## 2020-09-30 PROCEDURE — 97110 THERAPEUTIC EXERCISES: CPT | Performed by: SPECIALIST/TECHNOLOGIST

## 2020-10-07 ENCOUNTER — HOSPITAL ENCOUNTER (OUTPATIENT)
Dept: PHYSICAL THERAPY | Age: 60
Setting detail: THERAPIES SERIES
Discharge: HOME | End: 2020-10-07
Payer: COMMERCIAL

## 2020-10-07 PROCEDURE — 97110 THERAPEUTIC EXERCISES: CPT

## 2020-10-07 PROCEDURE — 97140 MANUAL THERAPY 1/> REGIONS: CPT

## 2020-10-07 PROCEDURE — 97112 NEUROMUSCULAR REEDUCATION: CPT

## 2020-10-07 NOTE — FLOWSHEET NOTE
[facility]    Physical Therapy Treatment Note/ Progress Report:     Date:  10/7/2020    Patient Name:  Jesus Salamanca  \"Hayder\"  :  1960  MRN: [id number]  Medical/Treatment Diagnosis Information:  · Diagnosis: Complex tear of medial meniscus of right knee (S83.231D), acute pain of right knee (M25.561); s/p R knee arthroscop, PMM, synovectomy, chondroplasty of patella, trochlea, medial femoral condyle 20  · Treatment Diagnosis: Acute right knee pain  Insurance/Certification information:  PT Insurance Information: [employer] - $3388 OOP max, $70 co-pay (goes towards OOP), 40 PT visits per calendar year  Physician Information:  Referring Practitioner: Dr. Irasema Regan of care signed (Y/N):     Date of Patient follow up with Physician: 8/10/20     Progress Report: [x]  Yes  []  No     Functional Scale: LEFS = 37%   Date: 7/15/20    Date Range for reporting period:  Beginnin/15/20  Ending:      Progress report due (10 Rx/or 30 days whichever is less): 48     Recertification due (POC duration/ or 90 days whichever is less): 20     Visit # Insurance Allowable Auth Needed   1/3 10/7 3 visits approved 10/1/20 - 20 [x]Yes    []No     Pain level:  2/10 Stiffness more than pain    SUBJECTIVE:  Pt reports still having some discomfort on lateral knee, but overall knee is doing much better. Pt has F/U with Guy next Thursday. Pt very eager to get back to jogging. OBJECTIVE:    Observation:    Test measurements:     2020: moderate lateral/superior patellar swelling and edema   20 RT knee moderate amount of edema and ballotable patella, fluid on the knee presentation.  Fair quad contraction but increased stiffness present with attempting knee flexion   20:  ROM LEFT RIGHT   Knee ext 0 0   Knee Flex 140 135   Strength (measured in lbs using hand held dynamometer) LEFT RIGHT   HIP Flexors 33.1 33.2   HIP Abductors 33.5 33.2   Knee EXT (quad) 52.7 58.8   Knee Flex (HS) 38.3 44.4           Circumference  Infrapatellar  Mid-patella  Suprapatellar       36.5 cm  39 cm  39.5 cm    37 cm  39 cm  40 cm    9/23/20 Rt knee  Fair quad  Contraction, poor vmo. Presented with Rt knee -5/ 0 after IASTM and prone hang/ hamstring today. Infrapatellar fat pad tenderness down toward patellar tendon. TKE increases infrapatellar pain with active quad contraction. LEFS 70%  9/30/20 LEFS  43%  ROM RT knee   0-140 Left knee 145    MMT: Rt knee Ext 44.1  Lt 39.8   Rt. Flex knee  43.5   Lt knee 48.4   RT hip Abd 18.3    Lt.  19.7    RESTRICTIONS/PRECAUTIONS: S/p R knee arthroscopy, PMM, synovectomy, chondroplasty of patella, trochlea and medial femoral condyle 7/7/20    Exercises/Interventions:   Therapeutic Ex 30' Resistance Sets/sec Reps Notes   BIKE   5'      HS stretch EOB  30\" 3    standing quad stretch  30\" 3    Foam roller - ITB    For HEP   Heel slides     Quad sets     SLR seated  Flex/abd3#   SLR+ seated  2  30\" 10  3x Monitor TKE   SAQ     SB bridges  2 10    S/L clamshell 3# 3 10    HS curls Ecc 30# 2 10    Leg press Ecc  Leg press SL   65#   2   10      Incline calf stretch  30\" 3x    Step up and overs    Lateral bandwalking Knee extension iso's    SAMANTHA Abd 60# 2 10 B   TRX squats SL split squats w/ chair  2 10           Manual Intervention 8'       Knee mobs/PROM 8'   + LE stretching   Tib/Fem Mobs       Patella Mobs       IASTM STM/roller stick   Distal ITB          NMR re-education 17'              Modified fwd & lat bosu lunges  5\" 15x Alternating B LE   Tandem stance    SLS HOLD   Retro slider lunges  2 10x    LSD 4\" 3 10    Retro step downs HOLD   Glut step ups Hold   BFR   128 mmHG/ blue cuff  8'   See below        [facility] BFR Smart Phase Protocol       Spoke with Dr. Priscilla Macedo regarding the use of BFR with patient.     BFR Session 2              [facility] Personalized Tourniquet System for BFR     LE: up to 80% LOP 60%      UE: up to 50% LOP        BFR Location: R thigh  BFR set at: 124 mmHg              Protocol: 30/15/15/15       Exercises:   Reps 30 sec Notes           SLR flex # of reps required  30 Rest 2x    completed       SLR abd reps required  15 Rest 2x    completed       LAQ reps required  15 Rest 2x    completed       Mini squats reps required  15 Rest Almost 2x    completed               Therapeutic Exercise and NMR EXR  [x] (83638) Provided verbal/tactile cueing for activities related to strengthening, flexibility, endurance, ROM for improvements in LE, proximal hip, and core control with self care, mobility, lifting, ambulation.  [] (20830) Provided verbal/tactile cueing for activities related to improving balance, coordination, kinesthetic sense, posture, motor skill, proprioception  to assist with LE, proximal hip, and core control in self care, mobility, lifting, ambulation and eccentric single leg control. NMR and Therapeutic Activities:  Pt educated about how to protect knee/ patellar tendon with ADL's and decrease amount of walking steps w/ elevation. HEP ok  Tandem balance but no  SLS focus due to increased stress  to tendon due to eccentric quad weakness or poor endurance. advised to focus on TKE and stretching posterior chain, hamstrings etc due to TKE deficits 10'   [x] (32151 or 20322) Provided verbal/tactile cueing for activities related to improving balance, coordination, kinesthetic sense, posture, motor skill, proprioception and motor activation to allow for proper function of core, proximal hip and LE with self care and ADLs  [] (71509) Gait Re-education- Provided training and instruction to the patient for proper LE, core and proximal hip recruitment and positioning and eccentric body weight control with ambulation re-education including up and down stairs     Home Exercise Program:    [x] (81725) Reviewed/Progressed HEP activities related to strengthening, flexibility, endurance, ROM of core, proximal hip and LE for functional self-care, mobility, lifting and ambulation/stair navigation   [] (26923)Reviewed/Progressed HEP activities related to improving balance, coordination, kinesthetic sense, posture, motor skill, proprioception of core, proximal hip and LE for self care, mobility, lifting, and ambulation/stair navigation      Manual Treatments:  PROM / STM / Oscillations-Mobs:  G-I, II, III, IV (PA's, Inf., Post.)  [x] (55630) Provided manual therapy to mobilize LE, proximal hip and/or LS spine soft tissue/joints for the purpose of modulating pain, promoting relaxation,  increasing ROM, reducing/eliminating soft tissue swelling/inflammation/restriction, improving soft tissue extensibility and allowing for proper ROM for normal function with self care, mobility, lifting and ambulation. Modalities:15' game-ready to decrease post-op swelling and inflammation -(high compression)  Charges:  Timed Code Treatment Minutes: 55   Total Treatment Minutes: 70       [] EVAL (LOW) 29524 (typically 20 minutes face-to-face)  [] EVAL (MOD) 90758 (typically 30 minutes face-to-face)  [] EVAL (HIGH) 89242 (typically 45 minutes face-to-face)  [] RE-EVAL     [x] VF(79770) x   2  [] IONTO (66891)  [x] NMR (82934) x  1   [] VASO (04031)  [x] Manual (03402) x 1   [] Other:  [] TA (96842)x     [] Mech Traction (40972)  [] ES(attended) (60379)     [] ES (un) (21430):     GOALS:   Patient stated goal: \"To be able to run 2 miles\"  []? Progressing: []? Met: [x]? Not Met: []? Adjusted     Therapist goals for Patient:   Short Term Goals: To be achieved in: 2 weeks  1. Independent in HEP and progression per patient tolerance, in order to prevent re-injury. []? Progressing: [x]? Met: []? Not Met: []? Adjusted  2. Patient will have a decrease in pain to facilitate improvement in movement, function, and ADLs as indicated by Functional Deficits. []? Progressing: [x]? Met: []? Not Met: []? Adjusted     Long Term Goals:  To be achieved in: 6 weeks  1. Disability index score of 25% or less for the LEFS to assist with reaching prior level of function. [x]? Progressing: []? Met: []? Not Met: []? Adjusted  2. Patient will demonstrate increased AROM to WNL for R knee to allow for proper joint functioning as indicated by patients Functional Deficits. []? Progressing: [x]? Met: []? Not Met: []? Adjusted  3. Patient will demonstrate an increase in Strength to good proximal hip strength and control, within 5lb HHD in LE to allow for proper functional mobility as indicated by patients Functional Deficits. [x]? Progressing: []? Met: []? Not Met: []? Adjusted  4. Patient will return to walk for 1 hour without increased symptoms or restriction. [x]? Progressing: []? Met: []? Not Met: []? Adjusted  5. Patient will be able to squat to pick something up off of the floor without increased symptoms or restriction. [x]? Progressing: []? Met: []? Not Met: []? Adjusted         Progression Towards Functional goals:  [x] Patient is progressing as expected towards functional goals listed. [] Progression is slowed due to complexities listed. [] Progression has been slowed due to co-morbidities. [] Plan just implemented, too soon to assess goals progression  [] Other:     ASSESSMENT:  Good tolerance to BFR noted. Mild TTP noted along Gerdy's tubercle with continued ITB tightness noted. Improved quad activation, especially VMO noted. Re-introduced LSD's and retro slider lunges with cues required to maintain appropriate form. Pt was fatigued with exercise progressions, but no increased knee pain noted. Pt continues to require further eccentric quad strength and control in order to return to PLOF, especially with jogging.        Return to Play: (if applicable)   []  Stage 1: Intro to Strength   []  Stage 2: Return to Run and Strength   []  Stage 3: Return to Jump and Strength   []  Stage 4: Dynamic Strength and Agility   []  Stage 5: Sport Specific Training     [] Ready to Return to Play, Meets All Above Stages   []  Not Ready for Return to Sports   Comments:            Treatment/Activity Tolerance:  [x] Patient tolerated treatment well [x] Patient limited by fatique  [] Patient limited by swelling  [] Patient limited by other medical complications  [] Other:     Overall Progression Towards Functional goals/ Treatment Progress Update:  [x] Patient is progressing as expected towards functional goals listed. [] Progression is slowed due to complexities/Impairments listed. [] Progression has been slowed due to co-morbidities. [] Plan just implemented, too soon to assess goals progression <30days   [] Goals require adjustment due to lack of progress  [] Patient is not progressing as expected and requires additional follow up with physician  [] Other    Prognosis for POC: [x] Good [] Fair  [] Poor    Patient requires continued skilled intervention: [x] Yes  [] No        PLAN:  Progress eccentric quad strength as tolerated by pt. Monitor patellar tendon pain. Pt has follow up with Guy next Thursday. [x] Continue per plan of care [] Alter current plan (see comments)  [] Plan of care initiated [] Hold pending insurance auth [] Discharge    Electronically signed by: Maria Green PT  Note: If patient does not return for scheduled/recommended follow up visits, this note will serve as a discharge from care along with the most recent update on progress.

## 2020-10-14 ENCOUNTER — HOSPITAL ENCOUNTER (OUTPATIENT)
Dept: PHYSICAL THERAPY | Age: 60
Setting detail: THERAPIES SERIES
Discharge: HOME | End: 2020-10-14
Payer: COMMERCIAL

## 2020-10-14 PROCEDURE — 97110 THERAPEUTIC EXERCISES: CPT | Performed by: SPECIALIST/TECHNOLOGIST

## 2020-10-14 PROCEDURE — 97112 NEUROMUSCULAR REEDUCATION: CPT | Performed by: SPECIALIST/TECHNOLOGIST

## 2020-10-14 PROCEDURE — 97140 MANUAL THERAPY 1/> REGIONS: CPT | Performed by: SPECIALIST/TECHNOLOGIST

## 2020-10-14 NOTE — FLOWSHEET NOTE
[employer]    Physical Therapy Treatment Note/ Progress Report:     Date:  10/14/2020    Patient Name:  Linwood Cunningham  \"Hayder\"  :  1960  MRN: [id number]  Medical/Treatment Diagnosis Information:  · Diagnosis: Complex tear of medial meniscus of right knee (S83.231D), acute pain of right knee (M25.561); s/p R knee arthroscop, PMM, synovectomy, chondroplasty of patella, trochlea, medial femoral condyle 20  · Treatment Diagnosis: Acute right knee pain  Insurance/Certification information:  PT Insurance Information: [employer] - $1316 OOP max, $70 co-pay (goes towards OOP), 40 PT visits per calendar year  Physician Information:  Referring Practitioner: Dr. Marielena Victor of care signed (Y/N):     Date of Patient follow up with Physician:  10/15/20     Progress Report: [x]  Yes  []  No     Functional Scale: LEFS = 37%   Date: 7/15/20    Date Range for reporting period:  Beginnin/15/20  Ending:      Progress report due (10 Rx/or 30 days whichever is less):      Recertification due (POC duration/ or 90 days whichever is less): 20     Visit # Insurance Allowable Auth Needed   /3 10/14 3 visits approved 10/1/20 - 20 [x]Yes    []No     Pain level:   5/10 Stiffness more than pain    SUBJECTIVE:  Pt reports having some increased Rt hamstring lower the last couple days but admits to  Standing more and focusing on stretching his hamstring but last few days symptoms in posterior knee more prevalent    OBJECTIVE:    Observation:    Test measurements:     2020: moderate lateral/superior patellar swelling and edema   20 RT knee moderate amount of edema and ballotable patella, fluid on the knee presentation.  Fair quad contraction but increased stiffness present with attempting knee flexion   20:  ROM LEFT RIGHT   Knee ext 0 0   Knee Flex 140 135   Strength (measured in lbs using hand held dynamometer) LEFT RIGHT   HIP Flexors 33.1 33.2   HIP Abductors 33.5 33.2   Knee EXT (quad) 52.7 58.8   Knee Flex (HS) 38.3 44.4           Circumference  Infrapatellar  Mid-patella  Suprapatellar       36.5 cm  39 cm  39.5 cm    37 cm  39 cm  40 cm    9/23/20 Rt knee  Fair quad  Contraction, poor vmo. Presented with Rt knee -5/ 0 after IASTM and prone hang/ hamstring today. Infrapatellar fat pad tenderness down toward patellar tendon. TKE increases infrapatellar pain with active quad contraction. LEFS 70%  9/30/20 LEFS  43%  ROM RT knee   0-140 Left knee 145    MMT: Rt knee Ext 44.1  Lt 39.8   Rt. Flex knee  43.5   Lt knee 48.4   RT hip Abd 18.3    Lt.  19.7  10/14/20  Rt knee 0 but increased stiffness with end range extension but active strengthening.minimal TTP over central 1/3 of hamstring/ popliteal fossa.      RESTRICTIONS/PRECAUTIONS: S/p R knee arthroscopy, PMM, synovectomy, chondroplasty of patella, trochlea and medial femoral condyle 7/7/20    Exercises/Interventions:   Therapeutic Ex 30' Resistance Sets/sec Reps Notes   BIKE   5'      HS stretch EOB  30\" 3 Monitor foot position   standing quad stretch  30\" 3    Foam roller - ITB    For HEP   Heel slides     Quad sets     SLR seated  Flex/abd3#   SLR+ seated  45\"   5x Monitor TKE   SAQ     SB bridges  2 10    S/L clamshell 3# 3 10    HS curls Ecc 30# 2 10    Leg press Ecc  Leg press SL 90#  65#   2   10      Incline calf stretch  30\" 3x    Step up and overs    Lateral bandwalking Knee extension iso's    SAMANTHA Abd 60# 2 10 B   TRX squats SL split squats w/ chair  2 10    wallsits  BS 45\" 5x    Manual Intervention 8'       Knee mobs/PROM 8'   + LE stretching   Tib/Fem Mobs       Patella Mobs       IASTM 8' Posterior knee/ popliteal fossa/ hamstringsSTM/roller stick   Distal ITB          NMR re-education 17'       RDL 's  5# 1 15x    Modified fwd & lat bosu lunges  5\" 15x Alternating B LE   Tandem stance    SLS with ball toss 4# airex 15x 2x    Retro slider lunges  2 10x    LSD 6\" 3 10    Retro step downs HOLD       Glut step ups Hold   BFR   128 mmHG/ blue cuff  8'   See below        Valerie BFR Smart Phase Protocol       Spoke with Dr. Candelaria Trevino regarding the use of BFR with patient. BFR Session 2              [facility] Personalized Tourniquet System for BFR     LE: up to 80% LOP 60% 110mmHg     UE: up to 50% LOP        BFR Location: R thigh  BFR set at: 124 mmHg              Protocol: 30/15/15/15       Exercises:   Reps 30 sec Notes           SLR flex # of reps required  30x Rest 2x    completed       SLR abd reps required  15x Rest 2x    completed       LAQ reps required  15x Rest 2x    completed       Mini squats reps required  15x Rest Almost 2x    completed               Therapeutic Exercise and NMR EXR  [x] (17051) Provided verbal/tactile cueing for activities related to strengthening, flexibility, endurance, ROM for improvements in LE, proximal hip, and core control with self care, mobility, lifting, ambulation.  [] (70578) Provided verbal/tactile cueing for activities related to improving balance, coordination, kinesthetic sense, posture, motor skill, proprioception  to assist with LE, proximal hip, and core control in self care, mobility, lifting, ambulation and eccentric single leg control. NMR and Therapeutic Activities:  Pt educated about how to protect knee/ patellar tendon with ADL's and decrease amount of walking steps w/ elevation. HEP ok  Tandem balance but no  SLS focus due to increased stress  to tendon due to eccentric quad weakness or poor endurance. advised to focus on TKE and stretching posterior chain, hamstrings etc due to TKE deficits 10'   [x] (08922 or 09446) Provided verbal/tactile cueing for activities related to improving balance, coordination, kinesthetic sense, posture, motor skill, proprioception and motor activation to allow for proper function of core, proximal hip and LE with self care and ADLs  [] (79602) Gait Re-education- Provided training and instruction tolerance, in order to prevent re-injury. []? Progressing: [x]? Met: []? Not Met: []? Adjusted  2. Patient will have a decrease in pain to facilitate improvement in movement, function, and ADLs as indicated by Functional Deficits. []? Progressing: [x]? Met: []? Not Met: []? Adjusted     Long Term Goals: To be achieved in: 6 weeks  1. Disability index score of 25% or less for the LEFS to assist with reaching prior level of function. [x]? Progressing: []? Met: []? Not Met: []? Adjusted  2. Patient will demonstrate increased AROM to WNL for R knee to allow for proper joint functioning as indicated by patients Functional Deficits. []? Progressing: [x]? Met: []? Not Met: []? Adjusted  3. Patient will demonstrate an increase in Strength to good proximal hip strength and control, within 5lb HHD in LE to allow for proper functional mobility as indicated by patients Functional Deficits. [x]? Progressing: []? Met: []? Not Met: []? Adjusted  4. Patient will return to walk for 1 hour without increased symptoms or restriction. [x]? Progressing: []? Met: []? Not Met: []? Adjusted  5. Patient will be able to squat to pick something up off of the floor without increased symptoms or restriction. [x]? Progressing: []? Met: []? Not Met: []? Adjusted         Progression Towards Functional goals:  [x] Patient is progressing as expected towards functional goals listed. [] Progression is slowed due to complexities listed. [] Progression has been slowed due to co-morbidities. [] Plan just implemented, too soon to assess goals progression  [] Other:     ASSESSMENT:  Good tolerance to BFR noted. Improved quad activation, especially VMO noted. Pt was fatigued with exercise progressions, but no increased knee pain noted. Pt reported less hamstring posterior knee discomfort after IASTM and progression of strengthening exercises.    Additional time spent focusing on education of HEP and proper sequencing of progressions and using good mechanics with wallsits/ SLR+ for endurance without compensation of other areas of the body. Pt focused on SAQ and overextending knee joint and advised to d/c performing  Return to Play: (if applicable)   []  Stage 1: Intro to Strength   []  Stage 2: Return to Run and Strength   []  Stage 3: Return to Jump and Strength   []  Stage 4: Dynamic Strength and Agility   []  Stage 5: Sport Specific Training     []  Ready to Return to Play, Meets All Above Stages   []  Not Ready for Return to Sports   Comments:            Treatment/Activity Tolerance:  [x] Patient tolerated treatment well [x] Patient limited by fatique  [] Patient limited by swelling  [] Patient limited by other medical complications  [] Other:     Overall Progression Towards Functional goals/ Treatment Progress Update:  [x] Patient is progressing as expected towards functional goals listed. [] Progression is slowed due to complexities/Impairments listed. [] Progression has been slowed due to co-morbidities. [] Plan just implemented, too soon to assess goals progression <30days   [] Goals require adjustment due to lack of progress  [] Patient is not progressing as expected and requires additional follow up with physician  [] Other    Prognosis for POC: [x] Good [] Fair  [] Poor    Patient requires continued skilled intervention: [x] Yes  [] No        PLAN:  Progress eccentric quad strength as tolerated by pt. Monitor patellar tendon pain. Pt has follow up with Malena next Thursday. [x] Continue per plan of care [] Alter current plan (see comments)  [] Plan of care initiated [x] 1 more approved  insurance visit  [] Discharge    Electronically signed by: Jese Brady, PTA, [id number]  Note: If patient does not return for scheduled/recommended follow up visits, this note will serve as a discharge from care along with the most recent update on progress.

## 2020-10-15 ENCOUNTER — OFFICE VISIT (OUTPATIENT)
Dept: ORTHOPEDIC SURGERY | Age: 60
End: 2020-10-15
Payer: COMMERCIAL

## 2020-10-15 VITALS — WEIGHT: 172 LBS | HEIGHT: 70 IN | BODY MASS INDEX: 24.62 KG/M2 | TEMPERATURE: 98.1 F

## 2020-10-15 PROCEDURE — 99214 OFFICE O/P EST MOD 30 MIN: CPT | Performed by: ORTHOPAEDIC SURGERY

## 2020-10-15 NOTE — PROGRESS NOTES
10/15/20  History of Present Illness:  Edgardo Harding is a 64 y.o. male    Chief complaint today in the office: Recheck evaluation right knee surgery was 7/7/2023 + months since surgery doing very well still has a little bit of stiffness if he sits for long periods of time    Location right knee   Severity minimal  Duration he is more than 3 months postop partial meniscectomy and synovectomy and chondroplasty overall he is back doing almost all regular activities without any discomfort  Associated sign/symptoms occasional discomfort with sitting for long periods of time    Medical History  Patient's medications, allergies, past medical, surgical, social and family histories were reviewed and updated as appropriate. I have reviewed and discussed the below Pain assessment findings with the patient. Review of Systems  No new rashes  No numbness  No tingling  No fever  No depression  No new pain patterns  Pertinent items are noted in HPI  Review of systems reviewed from Patient History Form dated on 6/11/2020 and available in the patient's chart under the Media tab. No change in the patient's medical history form. Examination:  General Exam:    Vitals: Temperature 98.1 °F (36.7 °C), height 5' 10\" (1.778 m), weight 172 lb (78 kg). BMI Readings from Last 3 Encounters:   10/15/20 24.68 kg/m²   09/17/20 24.68 kg/m²   08/10/20 24.68 kg/m²     Constitutional: Patient is adequately groomed with no evidence of malnutrition  Mental Status: The patient is alert and oriented to time, place and person. The patient's mood and affect are appropriate. Lymphatic: The lymphatic examination bilaterally reveals all areas to be without enlargement or induration. Vascular: Examination reveals no swelling or calf tenderness. Peripheral pulses are palpable and 2+. Neurological: The patient has good coordination. There is no weakness or sensory deficit.     Skin:    Head/Neck: inspection reveals no rashes, ulcerations or lesions. Trunk:  inspection reveals no rashes, ulcerations or lesions. Right Lower Extremity: inspection reveals no rashes, ulcerations or lesions. Left Lower Extremity: inspection reveals no rashes, ulcerations or lesions. PHYSICAL EXAM:      Knee Examination  Inspection:  No abrasions no lacerations no signs of infection or obvious deformity no obvious swelling and joint effusion. Palpation:   Palpation reveals mild pain medial joint line,   No lateral joint line pain, no joint effusion. Range of Motion: 0-150 degrees flexion/ extension   Hip extension to 20 hip flexion to 70+  Lumbar ROM -20 extension flexion to 6 inches from the floor. Strength: Quadriceps testing 5/5, hamstring muscle testing 4+/5, EHL against resistance is 5/5, hip flexor strength is intact 5/5, internal and external rotation of the hip against resistance is also intact 5/5. Special Tests: stable Lachman, negative anterior drawer, negative pivot shift, no posterior sag no posterior drawer does not open to valgus or varus stress at 0 or 30°, Steinmann's negative, Shilpa's negative, Homans negative Stephan negative, pedal pulses are +2/4 capillary refill is brisk sensation is intact ankle exam and hip exam are shows no pain with full range of motion provocative testing of the hip is nonpainful muscle testing around the hip is 5 over 5. Lumbar flexion to 6 inches from floor without pain. Gait: antalgic     Reflex: Intact  Lower extremity Deep tendon reflexes +2/4 and equal bilaterally for patella and Achilles. Upper extremity reflexes:  of the biceps, triceps, brachioradialis +2/4 equal bilaterally.     Contralateral  Knee: Negative Lachman negative anterior drawer negative pivot shift no posterior sag no posterior drawer does not open to valgus or varus stress at 0 or 30° negative Steinmann's negative Shilpa's negative Homans negative Stephan pedal pulses are +2/4 capillary refill is brisk sensation is intact ankle exam and hip exam are shows no pain with full range of motion provocative testing of the hip is nonpainful muscle testing around the hip is 5 over 5. Hip and lumbar testing does not reproduce pain evocative testing does not reproduce symptomatology. Additional Examinations:  Right Upper Extremity:  Examination of the right upper extremity does not show any tenderness, deformity or injury. Range of motion is unremarkable. There is no gross instability. There are no rashes, ulcerations or lesions. Strength and tone are normal.  Left Upper Extremity: Examination of the left upper extremity does not show any tenderness, deformity or injury. Range of motion is unremarkable. There is no gross instability. There are no rashes, ulcerations or lesions. Strength and tone are normal.  Lower Back: Examination of the lower back does not show any tenderness, deformity or injury. Range of motion is unremarkable. There is no gross instability. There are no rashes, ulcerations or lesions. Strength and tone are normal.    Past Surgical History:   Procedure Laterality Date    APPENDECTOMY      DENTAL SURGERY      HAND SURGERY      HERNIA REPAIR      KNEE ARTHROSCOPY Right 7/7/2020    RIGHT KNEE ARTHROSCOPY, PARTIAL MEDIAL MENISCECTOMY, SYNOVECTOMY performed by Regina Cedeno DO at \[facility]\""   . Impression: Postop 3 months doing extremely well partial meniscectomy synovectomy times    Office Procedures:  No orders of the defined types were placed in this encounter. Differential Diagnoses: Medial meniscus tear, synovitis, osteoarthritis, infection, contusion, knee pathology, Muscle injury, bone tumor or stress fracture. Possible other diagnoses: None is differential diagnosis      Plan (Medical Decision Making):    I discussed the diagnosis and the treatment options with Cherri Briscoe today.      In Summary:  The various treatment options were outlined and discussed with Clerance Pulse including:  Conservative care options: physical therapy, ice, medications, bracing, and activity modification. The indications for therapeutic injections. The indications for additional imaging/laboratory studies. The indications for (possible future) interventions. After considering the various options discussed, Kishore Mckenzie elected to pursue a course of treatment that includes the followin. Medications: No further recommendations for new medications. 2. PT:  Encouraged to continue with Home exercise program.    3. Further studies: No further studies. 4. Interventional:    Discontinue exercising and increase activities as tolerated risks, benefits and alternatives of interventional options were discussed. These include and are not limited to bleeding, infection, spinal headache, nerve injury, increased pain and lack of pain relief. The patient verbalized understanding and would like to proceed. The patient will be scheduled accordingly    5. Healthy Lifestyle Measures:  Patient education material reviewing the following was distributed to Clerance Pulse  Anatomic drawings  Healthy lifestyle education  Advanced imaging preparedness    Proper lifting and carrying techniques,   Weight management discussed  Quitting smoking      6. Follow up:  2-3 months      Kishore Mckenzie was instructed to call the office if his symptoms worsen or if new symptoms appear prior to the next scheduled visit. He is specifically instructed to contact the office between now & his scheduled appointment if he has concerns related to his condition or if he needs assistance in scheduling the above tests. He is   welcome to call for an appointment sooner if he has any additional concerns or questions.             Roger Baer DO    [facility]  Sports [facility] Trained  Board Certified  [employer]

## 2020-10-27 ENCOUNTER — HOSPITAL ENCOUNTER (OUTPATIENT)
Dept: PHYSICAL THERAPY | Age: 60
Setting detail: THERAPIES SERIES
Discharge: HOME | End: 2020-10-27
Payer: COMMERCIAL

## 2020-10-27 PROCEDURE — 97140 MANUAL THERAPY 1/> REGIONS: CPT

## 2020-10-27 PROCEDURE — 97110 THERAPEUTIC EXERCISES: CPT

## 2020-10-27 PROCEDURE — 97112 NEUROMUSCULAR REEDUCATION: CPT

## 2020-10-27 NOTE — FLOWSHEET NOTE
[facility]    Physical Therapy Treatment Note/ Progress Report:     Date:  10/27/2020    Patient Name:  Laya Sher  \"Hayder\"  :  1960  MRN: [id number]  Medical/Treatment Diagnosis Information:  · Diagnosis: Complex tear of medial meniscus of right knee (S83.231D), acute pain of right knee (M25.561); s/p R knee arthroscop, PMM, synovectomy, chondroplasty of patella, trochlea, medial femoral condyle 20  · Treatment Diagnosis: Acute right knee pain  Insurance/Certification information:  PT Insurance Information: [employer] - $5487 OOP max, $70 co-pay (goes towards OOP), 40 PT visits per calendar year  Physician Information:  Referring Practitioner: Dr. Mariano Carrera of care signed (Y/N):     Date of Patient follow up with Physician:  10/15/20     Progress Report: [x]  Yes  []  No     Functional Scale: LEFS = 37%   Date: 7/15/20    Date Range for reporting period:  Beginnin/15/20  Ending:      Progress report due (10 Rx/or 30 days whichever is less): 86     Recertification due (POC duration/ or 90 days whichever is less): 20     Visit # Insurance Allowable Auth Needed   3/3 10/27 3 visits approved 10/1/20 - 20 [x]Yes    []No     Pain level:   2/10    SUBJECTIVE:  Pt reports posterior leg still feels really tight. Pt states that he had to stand for about 1-1.5 hours today and some increased patellar pain. Pt has been sleeping with leg straighter and sitting with leg propped more up at work and feels like his knee has been feeling better overall. OBJECTIVE:    Observation:    Test measurements:     2020: moderate lateral/superior patellar swelling and edema   20 RT knee moderate amount of edema and ballotable patella, fluid on the knee presentation.  Fair quad contraction but increased stiffness present with attempting knee flexion   20:  ROM LEFT RIGHT   Knee ext 0 0   Knee Flex 140 135 Strength (measured in lbs using hand held dynamometer) LEFT RIGHT   HIP Flexors 33.1 33.2   HIP Abductors 33.5 33.2   Knee EXT (quad) 52.7 58.8   Knee Flex (HS) 38.3 44.4           Circumference  Infrapatellar  Mid-patella  Suprapatellar       36.5 cm  39 cm  39.5 cm    37 cm  39 cm  40 cm    9/23/20 Rt knee  Fair quad  Contraction, poor vmo. Presented with Rt knee -5/ 0 after IASTM and prone hang/ hamstring today. Infrapatellar fat pad tenderness down toward patellar tendon. TKE increases infrapatellar pain with active quad contraction. LEFS 70%  9/30/20 LEFS  43%  ROM RT knee   0-140 Left knee 145    MMT: Rt knee Ext 44.1  Lt 39.8   Rt. Flex knee  43.5   Lt knee 48.4   RT hip Abd 18.3    Lt.  19.7  10/14/20  Rt knee 0 but increased stiffness with end range extension but active strengthening.minimal TTP over central 1/3 of hamstring/ popliteal fossa.    10/27/20: LEFS = 68/80 = 15% disability     RESTRICTIONS/PRECAUTIONS: S/p R knee arthroscopy, PMM, synovectomy, chondroplasty of patella, trochlea and medial femoral condyle 7/7/20    Exercises/Interventions:   Therapeutic Ex 20' Resistance Sets/sec Reps Notes   BIKE   5'      HS stretch EOB  30\" 3 Monitor foot position   standing quad stretch  30\" 3    Foam roller - ITB    For HEP   Heel slides     Quad sets     SLR seated  Flex/abd3#   SLR+ seated  45\"   5x Monitor TKE   SAQ     SB bridges  2 10    S/L clamshell 3# 3 10    HS curls Ecc 30# 2 10    Leg press Ecc  Leg press SL 90#  65#   2   10      Incline calf stretch  30\" 3x    Step up and overs    Lateral bandwalking Knee extension iso's    SAMANTHA Abd 60# 2 10 B   TRX squats SL split squats w/ chair  2 10    wallsits  BS 45\" 5x    Manual Intervention 8'       Knee mobs/PROM    + LE stretching   Tib/Fem Mobs       Patella Mobs       IASTM 8' Posterior knee/ popliteal fossa/ hamstringsSTM/roller stick   Distal ITB          NMR re-education 17'       RDL 's  5# 1 15x    Modified fwd & lat bosu lunges  5\" 15x Alternating B LE   Tandem stance    SLS with ball toss 4# airex 15x 2x    Retro slider lunges  2 10x    LSD 6\" 3 10    Retro step downs HOLD       Glut step ups Hold   BFR   128 mmHG/ blue cuff  8'   See below        [facility] BFR Smart Phase Protocol       Spoke with Dr. Angelica Winters regarding the use of BFR with patient. BFR Session 3              [facility] Personalized Tourniquet System for BFR     LE: up to 80% LOP 60% 110mmHg     UE: up to 50% LOP        BFR Location: R thigh  BFR set at: 124 mmHg              Protocol: 30/15/15/15       Exercises:   Reps 30 sec Notes           SLR flex # of reps required  30x Rest 2x    completed       SLR abd reps required  15x Rest 2x    completed       LAQ reps required  15x Rest 2x    completed       Mini squats reps required  15x Rest 2x    completed               Therapeutic Exercise and NMR EXR  [x] (26196) Provided verbal/tactile cueing for activities related to strengthening, flexibility, endurance, ROM for improvements in LE, proximal hip, and core control with self care, mobility, lifting, ambulation.  [] (66953) Provided verbal/tactile cueing for activities related to improving balance, coordination, kinesthetic sense, posture, motor skill, proprioception  to assist with LE, proximal hip, and core control in self care, mobility, lifting, ambulation and eccentric single leg control. NMR and Therapeutic Activities:  Pt educated about how to protect knee/ patellar tendon with ADL's and decrease amount of walking steps w/ elevation. HEP ok  Tandem balance but no  SLS focus due to increased stress  to tendon due to eccentric quad weakness or poor endurance. advised to focus on TKE and stretching posterior chain, hamstrings etc due to TKE deficits 10'   [x] (49811 or 76850) Provided verbal/tactile cueing for activities related to improving balance, coordination, kinesthetic sense, posture, motor skill, proprioception and motor activation to allow for proper function of core, proximal hip and LE with self care and ADLs  [] (69716) Gait Re-education- Provided training and instruction to the patient for proper LE, core and proximal hip recruitment and positioning and eccentric body weight control with ambulation re-education including up and down stairs     Home Exercise Program:    [x] (10170) Reviewed/Progressed HEP activities related to strengthening, flexibility, endurance, ROM of core, proximal hip and LE for functional self-care, mobility, lifting and ambulation/stair navigation   [] (36221)Reviewed/Progressed HEP activities related to improving balance, coordination, kinesthetic sense, posture, motor skill, proprioception of core, proximal hip and LE for self care, mobility, lifting, and ambulation/stair navigation      Manual Treatments:  PROM / STM / Oscillations-Mobs:  G-I, II, III, IV (PA's, Inf., Post.)  [x] (41113) Provided manual therapy to mobilize LE, proximal hip and/or LS spine soft tissue/joints for the purpose of modulating pain, promoting relaxation,  increasing ROM, reducing/eliminating soft tissue swelling/inflammation/restriction, improving soft tissue extensibility and allowing for proper ROM for normal function with self care, mobility, lifting and ambulation. Modalities:15' game-ready to decrease post-op swelling and inflammation -(high compression)  Charges:  Timed Code Treatment Minutes: 45   Total Treatment Minutes: 60       [] EVAL (LOW) 74304 (typically 20 minutes face-to-face)  [] EVAL (MOD) 08006 (typically 30 minutes face-to-face)  [] EVAL (HIGH) 58845 (typically 45 minutes face-to-face)  [] RE-EVAL     [x] TJ(06845) x   1  [] IONTO (06191)  [x] NMR (90689) x  1   [] VASO (10462)  [x] Manual (20130) x 1   [] Other:  [] TA (08060)x     [] Mech Traction (49411)  [] ES(attended) (73282)     [] ES (un) (71171):     GOALS:   Patient stated goal: \"To be able to run 2 miles\"  []? Progressing: []? Met: [x]? Not Met: []?  Adjusted     Therapist goals for Patient:   Short Term Goals: To be achieved in: 2 weeks  1. Independent in HEP and progression per patient tolerance, in order to prevent re-injury. []? Progressing: [x]? Met: []? Not Met: []? Adjusted  2. Patient will have a decrease in pain to facilitate improvement in movement, function, and ADLs as indicated by Functional Deficits. []? Progressing: [x]? Met: []? Not Met: []? Adjusted     Long Term Goals: To be achieved in: 6 weeks  1. Disability index score of 25% or less for the LEFS to assist with reaching prior level of function. [x]? Progressing: []? Met: []? Not Met: []? Adjusted  2. Patient will demonstrate increased AROM to WNL for R knee to allow for proper joint functioning as indicated by patients Functional Deficits. []? Progressing: [x]? Met: []? Not Met: []? Adjusted  3. Patient will demonstrate an increase in Strength to good proximal hip strength and control, within 5lb HHD in LE to allow for proper functional mobility as indicated by patients Functional Deficits. [x]? Progressing: []? Met: []? Not Met: []? Adjusted  4. Patient will return to walk for 1 hour without increased symptoms or restriction. [x]? Progressing: []? Met: []? Not Met: []? Adjusted  5. Patient will be able to squat to pick something up off of the floor without increased symptoms or restriction. [x]? Progressing: []? Met: []? Not Met: []? Adjusted         Progression Towards Functional goals:  [x] Patient is progressing as expected towards functional goals listed. [] Progression is slowed due to complexities listed. [] Progression has been slowed due to co-morbidities. [] Plan just implemented, too soon to assess goals progression  [] Other:     ASSESSMENT:  Pt continues to be very fatigued with BFR. Tightness noted along lateral hamstrings and distal ITB with IASTM today. Educated pt on modifying activities due to patellar discomfort and hamstring tightness.  Reviewed HEP with pt and modified form with RDL's in order to decrease lower back stress and use appropriate form. Return to Play: (if applicable)   []  Stage 1: Intro to Strength   []  Stage 2: Return to Run and Strength   []  Stage 3: Return to Jump and Strength   []  Stage 4: Dynamic Strength and Agility   []  Stage 5: Sport Specific Training     []  Ready to Return to Play, Meets All Above Stages   []  Not Ready for Return to Sports   Comments:            Treatment/Activity Tolerance:  [x] Patient tolerated treatment well [x] Patient limited by fatique  [] Patient limited by swelling  [] Patient limited by other medical complications  [] Other:     Overall Progression Towards Functional goals/ Treatment Progress Update:  [x] Patient is progressing as expected towards functional goals listed. [] Progression is slowed due to complexities/Impairments listed. [] Progression has been slowed due to co-morbidities. [] Plan just implemented, too soon to assess goals progression <30days   [] Goals require adjustment due to lack of progress  [] Patient is not progressing as expected and requires additional follow up with physician  [] Other    Prognosis for POC: [x] Good [] Fair  [] Poor    Patient requires continued skilled intervention: [x] Yes  [] No        PLAN:  Pt to transition to [facility]. [x] Continue per plan of care [] Alter current plan (see comments)  [] Plan of care initiated [] 1 more approved  insurance visit  [x] Discharge    Electronically signed by: Sterling Tyler PT  Note: If patient does not return for scheduled/recommended follow up visits, this note will serve as a discharge from care along with the most recent update on progress.

## 2020-10-28 ENCOUNTER — APPOINTMENT (OUTPATIENT)
Dept: PHYSICAL THERAPY | Age: 60
End: 2020-10-28
Payer: COMMERCIAL

## 2021-06-02 ENCOUNTER — CLINICAL DOCUMENTATION (OUTPATIENT)
Dept: OTHER | Age: 61
End: 2021-06-02

## 2023-01-25 ENCOUNTER — APPOINTMENT (OUTPATIENT)
Dept: GENERAL RADIOLOGY | Age: 63
End: 2023-01-25
Payer: COMMERCIAL

## 2023-01-25 ENCOUNTER — HOSPITAL ENCOUNTER (EMERGENCY)
Age: 63
Discharge: HOME | End: 2023-01-25
Payer: COMMERCIAL

## 2023-01-25 VITALS
TEMPERATURE: 98 F | OXYGEN SATURATION: 98 % | RESPIRATION RATE: 16 BRPM | DIASTOLIC BLOOD PRESSURE: 76 MMHG | HEART RATE: 68 BPM | SYSTOLIC BLOOD PRESSURE: 129 MMHG

## 2023-01-25 DIAGNOSIS — R07.9 CHEST PAIN, UNSPECIFIED TYPE: Primary | ICD-10-CM

## 2023-01-25 LAB
A/G RATIO: 1.6 (ref 1.1–2.2)
ALBUMIN SERPL-MCNC: 4.6 G/DL (ref 3.4–5)
ALP BLD-CCNC: 54 U/L (ref 40–129)
ALT SERPL-CCNC: 19 U/L (ref 10–40)
ANION GAP SERPL CALCULATED.3IONS-SCNC: 9 MMOL/L (ref 3–16)
AST SERPL-CCNC: 21 U/L (ref 15–37)
BASOPHILS ABSOLUTE: 0 K/UL (ref 0–0.2)
BASOPHILS RELATIVE PERCENT: 0.4 %
BILIRUB SERPL-MCNC: 0.4 MG/DL (ref 0–1)
BUN BLDV-MCNC: 13 MG/DL (ref 7–20)
CALCIUM SERPL-MCNC: 9.5 MG/DL (ref 8.3–10.6)
CHLORIDE BLD-SCNC: 101 MMOL/L (ref 99–110)
CO2: 30 MMOL/L (ref 21–32)
CREAT SERPL-MCNC: 0.9 MG/DL (ref 0.8–1.3)
EOSINOPHILS ABSOLUTE: 0.1 K/UL (ref 0–0.6)
EOSINOPHILS RELATIVE PERCENT: 2.1 %
GFR SERPL CREATININE-BSD FRML MDRD: >60 ML/MIN/{1.73_M2}
GLUCOSE BLD-MCNC: 93 MG/DL (ref 70–99)
HCT VFR BLD CALC: 45.2 % (ref 40.5–52.5)
HEMOGLOBIN: 15.6 G/DL (ref 13.5–17.5)
LYMPHOCYTES ABSOLUTE: 2.3 K/UL (ref 1–5.1)
LYMPHOCYTES RELATIVE PERCENT: 33.9 %
MCH RBC QN AUTO: 31.3 PG (ref 26–34)
MCHC RBC AUTO-ENTMCNC: 34.6 G/DL (ref 31–36)
MCV RBC AUTO: 90.6 FL (ref 80–100)
MONOCYTES ABSOLUTE: 0.4 K/UL (ref 0–1.3)
MONOCYTES RELATIVE PERCENT: 6.3 %
NEUTROPHILS ABSOLUTE: 3.9 K/UL (ref 1.7–7.7)
NEUTROPHILS RELATIVE PERCENT: 57.3 %
PDW BLD-RTO: 13.2 % (ref 12.4–15.4)
PLATELET # BLD: 209 K/UL (ref 135–450)
PMV BLD AUTO: 8.2 FL (ref 5–10.5)
POTASSIUM SERPL-SCNC: 4.1 MMOL/L (ref 3.5–5.1)
RBC # BLD: 4.99 M/UL (ref 4.2–5.9)
SODIUM BLD-SCNC: 140 MMOL/L (ref 136–145)
TOTAL PROTEIN: 7.5 G/DL (ref 6.4–8.2)
TROPONIN: <0.01 NG/ML
WBC # BLD: 6.8 K/UL (ref 4–11)

## 2023-01-25 PROCEDURE — 93005 ELECTROCARDIOGRAM TRACING: CPT | Performed by: EMERGENCY MEDICINE

## 2023-01-25 PROCEDURE — 71045 X-RAY EXAM CHEST 1 VIEW: CPT

## 2023-01-25 PROCEDURE — 85025 COMPLETE CBC W/AUTO DIFF WBC: CPT

## 2023-01-25 PROCEDURE — 84484 ASSAY OF TROPONIN QUANT: CPT

## 2023-01-25 PROCEDURE — 99285 EMERGENCY DEPT VISIT HI MDM: CPT

## 2023-01-25 PROCEDURE — 80053 COMPREHEN METABOLIC PANEL: CPT

## 2023-01-27 LAB
EKG ATRIAL RATE: 78 BPM
EKG DIAGNOSIS: NORMAL
EKG P AXIS: 61 DEGREES
EKG P-R INTERVAL: 170 MS
EKG Q-T INTERVAL: 390 MS
EKG QRS DURATION: 104 MS
EKG QTC CALCULATION (BAZETT): 444 MS
EKG R AXIS: 93 DEGREES
EKG T AXIS: 19 DEGREES
EKG VENTRICULAR RATE: 78 BPM

## 2023-01-27 PROCEDURE — 93010 ELECTROCARDIOGRAM REPORT: CPT | Performed by: INTERNAL MEDICINE

## 2023-01-27 NOTE — ED PROVIDER NOTES
[address] [facility]  ED  EMERGENCY DEPARTMENT ENCOUNTER        Pt Name: Kd Dickerson  MRN: [id number]  [place] 1960  Date of evaluation: 1/25/2023  Provider: JASPAL Song CNP  PCP: No primary care provider on file. Note Started: 6:30 PM EST 1/27/23      GENO. I have evaluated this patient. My supervising physician was available for consultation. CHIEF COMPLAINT       Chief Complaint   Patient presents with    Chest Pain       HISTORY OF PRESENT ILLNESS: 1 or more Elements     History from : Patient    Limitations to history : None    Kd Dickerson is a 63 y.o. male who presents chest pain. Patient states that he developed chest pain after doing heavy lifting. Patient states that pain is somewhat persisted, started in his concrete. Patient denies any lisa shortness of breath, denies any fevers, denies any direct trauma, denies diaphoresis. Denies abdominal pain, vomiting. Nursing Notes were all reviewed and agreed with or any disagreements were addressed in the HPI. REVIEW OF SYSTEMS :      Review of Systems    Positives and Pertinent negatives as per HPI. SURGICAL HISTORY     Past Surgical History:   Procedure Laterality Date    APPENDECTOMY      DENTAL SURGERY      HAND SURGERY      HERNIA REPAIR      KNEE ARTHROSCOPY Right 7/7/2020    RIGHT KNEE ARTHROSCOPY, PARTIAL MEDIAL MENISCECTOMY, SYNOVECTOMY performed by Katarina Xavier DO at [address]       Discharge Medication List as of 1/25/2023  9:17 PM        CONTINUE these medications which have NOT CHANGED    Details   meloxicam (MOBIC) 15 MG tablet Take 1 tablet by mouth daily, Disp-90 tablet, R-3Print      Loratadine (CLARITIN PO) Take by mouthHistorical Med             ALLERGIES     Patient has no known allergies. FAMILYHISTORY     History reviewed. No pertinent family history.      SOCIAL HISTORY       Social History     Tobacco Use    Smoking status: Never    Smokeless tobacco: Never   Vaping Use    Vaping Use: Never used   Substance Use Topics    Alcohol use: Yes     Comment: social    Drug use: Never       SCREENINGS                         CIWA Assessment  BP: 129/76  Heart Rate: 68           PHYSICAL EXAM  1 or more Elements     ED Triage Vitals [01/25/23 1915]   BP Temp Temp Source Heart Rate Resp SpO2 Height Weight   (!) 138/104 98 °F (36.7 °C) Oral 76 16 98 % -- --       Physical Exam    Heart is regular rate and rhythm with no murmur, anterior chest wall is nontender. Lungs are clear to auscultation bilaterally, abdomen soft, nontender, nondistended. DIAGNOSTIC RESULTS   LABS:    Labs Reviewed   CBC WITH AUTO DIFFERENTIAL   COMPREHENSIVE METABOLIC PANEL   TROPONIN       When ordered only abnormal lab results are displayed. All other labs were within normal range or not returned as of this dictation. EKG: When ordered, EKG's are interpreted by the Emergency Department Physician in the absence of a cardiologist.  Please see their note for interpretation of EKG. RADIOLOGY:   Non-plain film images such as CT, Ultrasound and MRI are read by the radiologist. Plain radiographic images are visualized and preliminarily interpreted by the ED Provider with the below findings:        Interpretation per the Radiologist below, if available at the time of this note:    XR CHEST PORTABLE   Final Result   Borderline cardiomegaly. Clear lungs. XR CHEST PORTABLE    Result Date: 1/25/2023  EXAMINATION: ONE XRAY VIEW OF THE CHEST 1/25/2023 7:53 pm COMPARISON: None. HISTORY: ORDERING SYSTEM PROVIDED HISTORY: pain or SOB TECHNOLOGIST PROVIDED HISTORY: Reason for exam:->pain or SOB Reason for Exam: pain or SOB FINDINGS: The heart is borderline enlarged on the portable study with left ventricular configuration. The lungs are clear. No pneumothorax or pleural effusion. Right shoulder rotator cuff calcific tendinopathy. No acute bone finding. Borderline cardiomegaly. Clear lungs. No results found. PROCEDURES   Unless otherwise noted below, none     Procedures    CRITICAL CARE TIME (.cctime)       PAST MEDICAL HISTORY      has no past medical history on file. Chronic Conditions affecting Care:     EMERGENCY DEPARTMENT COURSE and DIFFERENTIAL DIAGNOSIS/MDM:   Vitals:    Vitals:    01/25/23 1915 01/25/23 2049   BP: (!) 138/104 129/76   Pulse: 76 68   Resp: 16 16   Temp: 98 °F (36.7 °C)    TempSrc: Oral    SpO2: 98% 98%       Patient was given the following medications:  Medications - No data to display          Is this patient to be included in the SEP-1 Core Measure due to severe sepsis or septic shock? No   Exclusion criteria - the patient is NOT to be included for SEP-1 Core Measure due to: Infection is not suspected    CONSULTS: (Who and What was discussed)  None  Discussion with Other Profesionals : None    Social Determinants : None    Records Reviewed : None    CC/HPI Summary, DDx, ED Course, and Reassessment: Patient presented to the emergency department today with complaint of chest pain, patient chest pain started when doing heavy lifting, it has persisted. Has had no diaphoresis, no lisa shortness of breath. His physical exam is unremarkable, cardiac enzymes negative, EKG nonischemic. He has a calculated heart score less than 4. I do feel comfortable discharge patient home, he was instructed return the ED for any emergent worsening symptoms. Patient and family verbalized understanding of the plan and agreed with it. I am the Primary Clinician of Record. FINAL IMPRESSION      1.  Chest pain, unspecified type          DISPOSITION/PLAN     DISPOSITION Decision to Discharge    PATIENT REFERRED TO:  [facility]  ED  [address] [facility] [address] [facility]  Go to   If symptoms worsen      DISCHARGE MEDICATIONS:  Discharge Medication List as of 1/25/2023  9:17 PM          DISCONTINUED MEDICATIONS:  Discharge Medication List as of 1/25/2023  9:17 PM                 (Please note that portions of this note were completed with a voice recognition program.  Efforts were made to edit the dictations but occasionally words are mis-transcribed.)    JASPAL Vera CNP (electronically signed)       JASPAL Vera CNP  01/27/23 8115

## (undated) DEVICE — SHEET,DRAPE,53X77,STERILE: Brand: MEDLINE

## (undated) DEVICE — BANDAGE ADH W1XL3IN NAT FAB WVN FLX DURABLE N ADH PD SEAL

## (undated) DEVICE — GOWN,AURORA,NONREINF,RAGLAN,XXL,STERILE: Brand: MEDLINE

## (undated) DEVICE — 3.5 MM FULL RADIUS ELITE STRAIGHT                                    DISPOSABLE BLADES, BEIGE,PACKAGED 6                                    PER BOX, STERILE

## (undated) DEVICE — SUTURE ETHLN SZ 4-0 L18IN NONABSORBABLE BLK L19MM PS-2 3/8 1667H

## (undated) DEVICE — GOWN SIRUS NONREIN XL W/TWL: Brand: MEDLINE INDUSTRIES, INC.

## (undated) DEVICE — GLOVE SURG SZ 9 THK91MIL LTX FREE SYN POLYISOPRENE ANTI

## (undated) DEVICE — GAUZE,SPONGE,4"X4",8PLY,STRL,LF,10/TRAY: Brand: MEDLINE

## (undated) DEVICE — APPLICATOR PREP 26ML 0.7% IOD POVACRYLEX 74% ISO ALC ST

## (undated) DEVICE — DYONICS 25 PATIENT TUBE SET MUST                                    BE USED WITH 7211007, 12 PER BOX

## (undated) DEVICE — HYPODERMIC SAFETY NEEDLE: Brand: MAGELLAN

## (undated) DEVICE — SYRINGE, LUER LOCK, 60ML: Brand: MEDLINE

## (undated) DEVICE — 3M™ STERI-DRAPE™ U-DRAPE 1015: Brand: STERI-DRAPE™

## (undated) DEVICE — GLOVE ORANGE PI 8 1/2   MSG9085

## (undated) DEVICE — Device

## (undated) DEVICE — MASC TURNOVER KIT: Brand: MEDLINE INDUSTRIES, INC.

## (undated) DEVICE — WEREWOLF FLOW 50 COBLATION WAND: Brand: COBLATION